# Patient Record
Sex: FEMALE | Race: BLACK OR AFRICAN AMERICAN | NOT HISPANIC OR LATINO
[De-identification: names, ages, dates, MRNs, and addresses within clinical notes are randomized per-mention and may not be internally consistent; named-entity substitution may affect disease eponyms.]

---

## 2017-03-30 ENCOUNTER — APPOINTMENT (OUTPATIENT)
Dept: OPHTHALMOLOGY | Facility: CLINIC | Age: 77
End: 2017-03-30

## 2017-03-30 PROBLEM — Z00.00 ENCOUNTER FOR PREVENTIVE HEALTH EXAMINATION: Status: ACTIVE | Noted: 2017-03-30

## 2017-05-01 ENCOUNTER — APPOINTMENT (OUTPATIENT)
Dept: OPHTHALMOLOGY | Facility: CLINIC | Age: 77
End: 2017-05-01

## 2017-08-07 ENCOUNTER — APPOINTMENT (OUTPATIENT)
Dept: OPHTHALMOLOGY | Facility: CLINIC | Age: 77
End: 2017-08-07
Payer: MEDICARE

## 2017-08-07 PROCEDURE — 92012 INTRM OPH EXAM EST PATIENT: CPT

## 2017-09-09 ENCOUNTER — OUTPATIENT (OUTPATIENT)
Dept: OUTPATIENT SERVICES | Facility: HOSPITAL | Age: 77
LOS: 1 days | End: 2017-09-09

## 2017-09-09 DIAGNOSIS — Z00.8 ENCOUNTER FOR OTHER GENERAL EXAMINATION: ICD-10-CM

## 2017-09-11 ENCOUNTER — APPOINTMENT (OUTPATIENT)
Dept: RADIOLOGY | Facility: IMAGING CENTER | Age: 77
End: 2017-09-11
Payer: MEDICARE

## 2017-09-11 ENCOUNTER — OUTPATIENT (OUTPATIENT)
Dept: OUTPATIENT SERVICES | Facility: HOSPITAL | Age: 77
LOS: 1 days | End: 2017-09-11
Payer: MEDICARE

## 2017-09-11 DIAGNOSIS — Z00.8 ENCOUNTER FOR OTHER GENERAL EXAMINATION: ICD-10-CM

## 2017-09-11 PROCEDURE — 77080 DXA BONE DENSITY AXIAL: CPT | Mod: 26

## 2017-09-11 PROCEDURE — 77080 DXA BONE DENSITY AXIAL: CPT

## 2017-11-14 ENCOUNTER — APPOINTMENT (OUTPATIENT)
Dept: OPHTHALMOLOGY | Facility: CLINIC | Age: 77
End: 2017-11-14
Payer: MEDICARE

## 2017-11-14 PROCEDURE — 92015 DETERMINE REFRACTIVE STATE: CPT

## 2017-11-14 PROCEDURE — 92012 INTRM OPH EXAM EST PATIENT: CPT

## 2017-11-14 PROCEDURE — 92083 EXTENDED VISUAL FIELD XM: CPT

## 2018-02-12 ENCOUNTER — APPOINTMENT (OUTPATIENT)
Dept: OPHTHALMOLOGY | Facility: CLINIC | Age: 78
End: 2018-02-12
Payer: MEDICARE

## 2018-02-12 PROCEDURE — 92133 CPTRZD OPH DX IMG PST SGM ON: CPT

## 2018-02-12 PROCEDURE — 92014 COMPRE OPH EXAM EST PT 1/>: CPT

## 2018-04-02 ENCOUNTER — OUTPATIENT (OUTPATIENT)
Dept: OUTPATIENT SERVICES | Facility: HOSPITAL | Age: 78
LOS: 1 days | End: 2018-04-02
Payer: MEDICARE

## 2018-04-02 ENCOUNTER — APPOINTMENT (OUTPATIENT)
Dept: MAMMOGRAPHY | Facility: IMAGING CENTER | Age: 78
End: 2018-04-02
Payer: MEDICARE

## 2018-04-02 ENCOUNTER — APPOINTMENT (OUTPATIENT)
Dept: ULTRASOUND IMAGING | Facility: IMAGING CENTER | Age: 78
End: 2018-04-02
Payer: MEDICARE

## 2018-04-02 DIAGNOSIS — R92.2 INCONCLUSIVE MAMMOGRAM: ICD-10-CM

## 2018-04-02 DIAGNOSIS — N64.4 MASTODYNIA: ICD-10-CM

## 2018-04-02 PROCEDURE — 77063 BREAST TOMOSYNTHESIS BI: CPT | Mod: 26

## 2018-04-02 PROCEDURE — 77067 SCR MAMMO BI INCL CAD: CPT | Mod: 26

## 2018-04-02 PROCEDURE — 76641 ULTRASOUND BREAST COMPLETE: CPT

## 2018-04-02 PROCEDURE — 76641 ULTRASOUND BREAST COMPLETE: CPT | Mod: 26,50

## 2018-04-02 PROCEDURE — 77067 SCR MAMMO BI INCL CAD: CPT

## 2018-04-02 PROCEDURE — 77063 BREAST TOMOSYNTHESIS BI: CPT

## 2018-04-09 ENCOUNTER — APPOINTMENT (OUTPATIENT)
Dept: ULTRASOUND IMAGING | Facility: IMAGING CENTER | Age: 78
End: 2018-04-09
Payer: MEDICARE

## 2018-04-09 ENCOUNTER — APPOINTMENT (OUTPATIENT)
Dept: MAMMOGRAPHY | Facility: IMAGING CENTER | Age: 78
End: 2018-04-09
Payer: MEDICARE

## 2018-04-09 ENCOUNTER — OUTPATIENT (OUTPATIENT)
Dept: OUTPATIENT SERVICES | Facility: HOSPITAL | Age: 78
LOS: 1 days | End: 2018-04-09
Payer: MEDICARE

## 2018-04-09 DIAGNOSIS — R92.2 INCONCLUSIVE MAMMOGRAM: ICD-10-CM

## 2018-04-09 PROCEDURE — 77065 DX MAMMO INCL CAD UNI: CPT

## 2018-04-09 PROCEDURE — 76642 ULTRASOUND BREAST LIMITED: CPT

## 2018-04-09 PROCEDURE — 77065 DX MAMMO INCL CAD UNI: CPT | Mod: 26,LT

## 2018-04-09 PROCEDURE — G0279: CPT | Mod: 26

## 2018-04-09 PROCEDURE — 76642 ULTRASOUND BREAST LIMITED: CPT | Mod: 26,LT

## 2018-04-09 PROCEDURE — G0279: CPT

## 2018-05-07 ENCOUNTER — APPOINTMENT (OUTPATIENT)
Dept: OPHTHALMOLOGY | Facility: CLINIC | Age: 78
End: 2018-05-07
Payer: MEDICARE

## 2018-05-07 PROCEDURE — 92083 EXTENDED VISUAL FIELD XM: CPT

## 2018-05-07 PROCEDURE — 92012 INTRM OPH EXAM EST PATIENT: CPT

## 2018-05-07 PROCEDURE — ZZZZZ: CPT

## 2018-08-13 ENCOUNTER — APPOINTMENT (OUTPATIENT)
Dept: OPHTHALMOLOGY | Facility: CLINIC | Age: 78
End: 2018-08-13
Payer: MEDICARE

## 2018-08-13 PROCEDURE — 92133 CPTRZD OPH DX IMG PST SGM ON: CPT

## 2018-08-13 PROCEDURE — 92014 COMPRE OPH EXAM EST PT 1/>: CPT

## 2018-11-12 ENCOUNTER — APPOINTMENT (OUTPATIENT)
Dept: OPHTHALMOLOGY | Facility: CLINIC | Age: 78
End: 2018-11-12
Payer: MEDICARE

## 2018-11-12 PROCEDURE — 92012 INTRM OPH EXAM EST PATIENT: CPT

## 2018-11-12 PROCEDURE — 92083 EXTENDED VISUAL FIELD XM: CPT

## 2018-11-12 PROCEDURE — ZZZZZ: CPT

## 2019-03-18 ENCOUNTER — APPOINTMENT (OUTPATIENT)
Dept: OPHTHALMOLOGY | Facility: CLINIC | Age: 79
End: 2019-03-18

## 2019-04-15 ENCOUNTER — APPOINTMENT (OUTPATIENT)
Dept: ULTRASOUND IMAGING | Facility: IMAGING CENTER | Age: 79
End: 2019-04-15
Payer: MEDICARE

## 2019-04-15 ENCOUNTER — APPOINTMENT (OUTPATIENT)
Dept: RADIOLOGY | Facility: IMAGING CENTER | Age: 79
End: 2019-04-15
Payer: MEDICARE

## 2019-04-15 ENCOUNTER — APPOINTMENT (OUTPATIENT)
Dept: MAMMOGRAPHY | Facility: IMAGING CENTER | Age: 79
End: 2019-04-15
Payer: MEDICARE

## 2019-04-15 ENCOUNTER — OUTPATIENT (OUTPATIENT)
Dept: OUTPATIENT SERVICES | Facility: HOSPITAL | Age: 79
LOS: 1 days | End: 2019-04-15
Payer: MEDICARE

## 2019-04-15 DIAGNOSIS — Z13.820 ENCOUNTER FOR SCREENING FOR OSTEOPOROSIS: ICD-10-CM

## 2019-04-15 DIAGNOSIS — Z12.31 ENCOUNTER FOR SCREENING MAMMOGRAM FOR MALIGNANT NEOPLASM OF BREAST: ICD-10-CM

## 2019-04-15 PROCEDURE — 77067 SCR MAMMO BI INCL CAD: CPT

## 2019-04-15 PROCEDURE — 76641 ULTRASOUND BREAST COMPLETE: CPT

## 2019-04-15 PROCEDURE — 77063 BREAST TOMOSYNTHESIS BI: CPT | Mod: 26

## 2019-04-15 PROCEDURE — 76641 ULTRASOUND BREAST COMPLETE: CPT | Mod: 26,50

## 2019-04-15 PROCEDURE — 77067 SCR MAMMO BI INCL CAD: CPT | Mod: 26

## 2019-04-15 PROCEDURE — 77063 BREAST TOMOSYNTHESIS BI: CPT

## 2019-04-29 ENCOUNTER — NON-APPOINTMENT (OUTPATIENT)
Age: 79
End: 2019-04-29

## 2019-04-29 ENCOUNTER — APPOINTMENT (OUTPATIENT)
Dept: OPHTHALMOLOGY | Facility: CLINIC | Age: 79
End: 2019-04-29
Payer: MEDICARE

## 2019-04-29 PROCEDURE — 92250 FUNDUS PHOTOGRAPHY W/I&R: CPT

## 2019-04-29 PROCEDURE — 92014 COMPRE OPH EXAM EST PT 1/>: CPT

## 2019-08-12 ENCOUNTER — APPOINTMENT (OUTPATIENT)
Dept: OPHTHALMOLOGY | Facility: CLINIC | Age: 79
End: 2019-08-12
Payer: MEDICARE

## 2019-08-12 ENCOUNTER — NON-APPOINTMENT (OUTPATIENT)
Age: 79
End: 2019-08-12

## 2019-08-12 PROCEDURE — ZZZZZ: CPT

## 2019-08-12 PROCEDURE — 92083 EXTENDED VISUAL FIELD XM: CPT

## 2019-08-12 PROCEDURE — 92133 CPTRZD OPH DX IMG PST SGM ON: CPT

## 2019-08-12 PROCEDURE — 92012 INTRM OPH EXAM EST PATIENT: CPT

## 2019-11-11 ENCOUNTER — NON-APPOINTMENT (OUTPATIENT)
Age: 79
End: 2019-11-11

## 2019-11-11 ENCOUNTER — APPOINTMENT (OUTPATIENT)
Dept: OPHTHALMOLOGY | Facility: CLINIC | Age: 79
End: 2019-11-11
Payer: MEDICARE

## 2019-11-11 PROCEDURE — 92012 INTRM OPH EXAM EST PATIENT: CPT

## 2019-12-03 ENCOUNTER — NON-APPOINTMENT (OUTPATIENT)
Age: 79
End: 2019-12-03

## 2019-12-03 ENCOUNTER — APPOINTMENT (OUTPATIENT)
Dept: OPHTHALMOLOGY | Facility: CLINIC | Age: 79
End: 2019-12-03
Payer: MEDICARE

## 2019-12-03 PROCEDURE — 92133 CPTRZD OPH DX IMG PST SGM ON: CPT

## 2019-12-03 PROCEDURE — 92014 COMPRE OPH EXAM EST PT 1/>: CPT

## 2019-12-03 PROCEDURE — 92083 EXTENDED VISUAL FIELD XM: CPT

## 2019-12-03 PROCEDURE — 92020 GONIOSCOPY: CPT

## 2019-12-04 ENCOUNTER — APPOINTMENT (OUTPATIENT)
Dept: OPHTHALMOLOGY | Facility: CLINIC | Age: 79
End: 2019-12-04

## 2019-12-11 ENCOUNTER — APPOINTMENT (OUTPATIENT)
Dept: OPHTHALMOLOGY | Facility: CLINIC | Age: 79
End: 2019-12-11
Payer: MEDICARE

## 2019-12-11 ENCOUNTER — NON-APPOINTMENT (OUTPATIENT)
Age: 79
End: 2019-12-11

## 2019-12-11 PROCEDURE — 65855 TRABECULOPLASTY LASER SURG: CPT | Mod: LT

## 2019-12-26 ENCOUNTER — APPOINTMENT (OUTPATIENT)
Dept: OPHTHALMOLOGY | Facility: CLINIC | Age: 79
End: 2019-12-26
Payer: MEDICARE

## 2019-12-26 ENCOUNTER — NON-APPOINTMENT (OUTPATIENT)
Age: 79
End: 2019-12-26

## 2019-12-26 PROCEDURE — 65855 TRABECULOPLASTY LASER SURG: CPT | Mod: RT

## 2020-01-07 ENCOUNTER — APPOINTMENT (OUTPATIENT)
Dept: OPHTHALMOLOGY | Facility: CLINIC | Age: 80
End: 2020-01-07
Payer: MEDICARE

## 2020-01-07 ENCOUNTER — NON-APPOINTMENT (OUTPATIENT)
Age: 80
End: 2020-01-07

## 2020-01-07 PROCEDURE — 92012 INTRM OPH EXAM EST PATIENT: CPT

## 2020-02-10 ENCOUNTER — APPOINTMENT (OUTPATIENT)
Dept: OPHTHALMOLOGY | Facility: CLINIC | Age: 80
End: 2020-02-10
Payer: MEDICARE

## 2020-02-10 ENCOUNTER — NON-APPOINTMENT (OUTPATIENT)
Age: 80
End: 2020-02-10

## 2020-02-10 PROCEDURE — 92012 INTRM OPH EXAM EST PATIENT: CPT

## 2020-02-13 ENCOUNTER — APPOINTMENT (OUTPATIENT)
Dept: OPHTHALMOLOGY | Facility: CLINIC | Age: 80
End: 2020-02-13

## 2020-02-13 ENCOUNTER — APPOINTMENT (OUTPATIENT)
Dept: OPHTHALMOLOGY | Facility: CLINIC | Age: 80
End: 2020-02-13
Payer: MEDICARE

## 2020-02-13 ENCOUNTER — NON-APPOINTMENT (OUTPATIENT)
Age: 80
End: 2020-02-13

## 2020-02-13 PROCEDURE — 92012 INTRM OPH EXAM EST PATIENT: CPT

## 2020-03-26 ENCOUNTER — APPOINTMENT (OUTPATIENT)
Dept: OPHTHALMOLOGY | Facility: CLINIC | Age: 80
End: 2020-03-26

## 2020-06-08 ENCOUNTER — APPOINTMENT (OUTPATIENT)
Dept: OPHTHALMOLOGY | Facility: CLINIC | Age: 80
End: 2020-06-08
Payer: MEDICARE

## 2020-06-08 ENCOUNTER — NON-APPOINTMENT (OUTPATIENT)
Age: 80
End: 2020-06-08

## 2020-06-08 PROCEDURE — 92012 INTRM OPH EXAM EST PATIENT: CPT

## 2020-07-27 ENCOUNTER — OUTPATIENT (OUTPATIENT)
Dept: OUTPATIENT SERVICES | Facility: HOSPITAL | Age: 80
LOS: 1 days | End: 2020-07-27
Payer: MEDICARE

## 2020-07-27 ENCOUNTER — APPOINTMENT (OUTPATIENT)
Dept: MAMMOGRAPHY | Facility: IMAGING CENTER | Age: 80
End: 2020-07-27
Payer: MEDICARE

## 2020-07-27 ENCOUNTER — APPOINTMENT (OUTPATIENT)
Dept: ULTRASOUND IMAGING | Facility: IMAGING CENTER | Age: 80
End: 2020-07-27
Payer: MEDICARE

## 2020-07-27 DIAGNOSIS — Z12.31 ENCOUNTER FOR SCREENING MAMMOGRAM FOR MALIGNANT NEOPLASM OF BREAST: ICD-10-CM

## 2020-07-27 PROCEDURE — 76641 ULTRASOUND BREAST COMPLETE: CPT

## 2020-07-27 PROCEDURE — 76641 ULTRASOUND BREAST COMPLETE: CPT | Mod: 26,50

## 2020-07-27 PROCEDURE — 77067 SCR MAMMO BI INCL CAD: CPT

## 2020-07-27 PROCEDURE — 77067 SCR MAMMO BI INCL CAD: CPT | Mod: 26

## 2020-07-27 PROCEDURE — 77063 BREAST TOMOSYNTHESIS BI: CPT | Mod: 26

## 2020-07-27 PROCEDURE — 77063 BREAST TOMOSYNTHESIS BI: CPT

## 2020-08-24 ENCOUNTER — APPOINTMENT (OUTPATIENT)
Dept: CT IMAGING | Facility: IMAGING CENTER | Age: 80
End: 2020-08-24
Payer: MEDICARE

## 2020-08-24 ENCOUNTER — OUTPATIENT (OUTPATIENT)
Dept: OUTPATIENT SERVICES | Facility: HOSPITAL | Age: 80
LOS: 1 days | End: 2020-08-24
Payer: MEDICARE

## 2020-08-24 DIAGNOSIS — Z00.8 ENCOUNTER FOR OTHER GENERAL EXAMINATION: ICD-10-CM

## 2020-08-24 PROCEDURE — 82565 ASSAY OF CREATININE: CPT

## 2020-08-24 PROCEDURE — 75635 CT ANGIO ABDOMINAL ARTERIES: CPT

## 2020-08-24 PROCEDURE — 75635 CT ANGIO ABDOMINAL ARTERIES: CPT | Mod: 26

## 2020-09-14 ENCOUNTER — NON-APPOINTMENT (OUTPATIENT)
Age: 80
End: 2020-09-14

## 2020-09-14 ENCOUNTER — APPOINTMENT (OUTPATIENT)
Dept: OPHTHALMOLOGY | Facility: CLINIC | Age: 80
End: 2020-09-14
Payer: MEDICARE

## 2020-09-14 PROCEDURE — 92286 ANT SGM IMG I&R SPECLR MIC: CPT

## 2020-09-14 PROCEDURE — 92133 CPTRZD OPH DX IMG PST SGM ON: CPT

## 2020-09-14 PROCEDURE — ZZZZZ: CPT

## 2020-09-14 PROCEDURE — 92083 EXTENDED VISUAL FIELD XM: CPT

## 2020-09-14 PROCEDURE — 92012 INTRM OPH EXAM EST PATIENT: CPT

## 2020-12-07 ENCOUNTER — NON-APPOINTMENT (OUTPATIENT)
Age: 80
End: 2020-12-07

## 2020-12-07 ENCOUNTER — APPOINTMENT (OUTPATIENT)
Dept: OPHTHALMOLOGY | Facility: CLINIC | Age: 80
End: 2020-12-07
Payer: MEDICARE

## 2020-12-07 PROCEDURE — 92012 INTRM OPH EXAM EST PATIENT: CPT

## 2020-12-08 ENCOUNTER — APPOINTMENT (OUTPATIENT)
Dept: OPHTHALMOLOGY | Facility: CLINIC | Age: 80
End: 2020-12-08

## 2020-12-17 ENCOUNTER — APPOINTMENT (OUTPATIENT)
Dept: OPHTHALMOLOGY | Facility: CLINIC | Age: 80
End: 2020-12-17

## 2021-01-08 ENCOUNTER — NON-APPOINTMENT (OUTPATIENT)
Age: 81
End: 2021-01-08

## 2021-01-08 ENCOUNTER — APPOINTMENT (OUTPATIENT)
Dept: OPHTHALMOLOGY | Facility: CLINIC | Age: 81
End: 2021-01-08
Payer: MEDICARE

## 2021-01-08 PROCEDURE — 92012 INTRM OPH EXAM EST PATIENT: CPT

## 2021-01-20 DIAGNOSIS — Z01.818 ENCOUNTER FOR OTHER PREPROCEDURAL EXAMINATION: ICD-10-CM

## 2021-01-22 ENCOUNTER — APPOINTMENT (OUTPATIENT)
Dept: DISASTER EMERGENCY | Facility: CLINIC | Age: 81
End: 2021-01-22

## 2021-01-23 LAB — SARS-COV-2 N GENE NPH QL NAA+PROBE: NOT DETECTED

## 2021-01-27 ENCOUNTER — APPOINTMENT (OUTPATIENT)
Dept: OPHTHALMOLOGY | Facility: AMBULATORY SURGERY CENTER | Age: 81
End: 2021-01-27
Payer: MEDICARE

## 2021-01-27 PROCEDURE — 66180 AQUEOUS SHUNT EYE W/GRAFT: CPT | Mod: LT

## 2021-01-28 ENCOUNTER — NON-APPOINTMENT (OUTPATIENT)
Age: 81
End: 2021-01-28

## 2021-01-28 ENCOUNTER — APPOINTMENT (OUTPATIENT)
Dept: OPHTHALMOLOGY | Facility: CLINIC | Age: 81
End: 2021-01-28
Payer: MEDICARE

## 2021-01-28 PROCEDURE — 99024 POSTOP FOLLOW-UP VISIT: CPT

## 2021-02-04 ENCOUNTER — NON-APPOINTMENT (OUTPATIENT)
Age: 81
End: 2021-02-04

## 2021-02-04 ENCOUNTER — APPOINTMENT (OUTPATIENT)
Dept: OPHTHALMOLOGY | Facility: CLINIC | Age: 81
End: 2021-02-04
Payer: MEDICARE

## 2021-02-04 PROCEDURE — 99024 POSTOP FOLLOW-UP VISIT: CPT

## 2021-02-11 ENCOUNTER — NON-APPOINTMENT (OUTPATIENT)
Age: 81
End: 2021-02-11

## 2021-02-11 ENCOUNTER — APPOINTMENT (OUTPATIENT)
Dept: OPHTHALMOLOGY | Facility: CLINIC | Age: 81
End: 2021-02-11
Payer: MEDICARE

## 2021-02-11 PROCEDURE — 99024 POSTOP FOLLOW-UP VISIT: CPT

## 2021-02-18 ENCOUNTER — NON-APPOINTMENT (OUTPATIENT)
Age: 81
End: 2021-02-18

## 2021-02-18 ENCOUNTER — APPOINTMENT (OUTPATIENT)
Dept: OPHTHALMOLOGY | Facility: CLINIC | Age: 81
End: 2021-02-18
Payer: MEDICARE

## 2021-02-18 PROCEDURE — 99024 POSTOP FOLLOW-UP VISIT: CPT

## 2021-02-25 ENCOUNTER — APPOINTMENT (OUTPATIENT)
Dept: OPHTHALMOLOGY | Facility: CLINIC | Age: 81
End: 2021-02-25
Payer: MEDICARE

## 2021-02-25 ENCOUNTER — NON-APPOINTMENT (OUTPATIENT)
Age: 81
End: 2021-02-25

## 2021-02-25 PROCEDURE — 99024 POSTOP FOLLOW-UP VISIT: CPT

## 2021-03-02 ENCOUNTER — APPOINTMENT (OUTPATIENT)
Dept: OPHTHALMOLOGY | Facility: CLINIC | Age: 81
End: 2021-03-02
Payer: MEDICARE

## 2021-03-02 ENCOUNTER — NON-APPOINTMENT (OUTPATIENT)
Age: 81
End: 2021-03-02

## 2021-03-02 PROCEDURE — 99024 POSTOP FOLLOW-UP VISIT: CPT

## 2021-03-25 ENCOUNTER — APPOINTMENT (OUTPATIENT)
Dept: OPHTHALMOLOGY | Facility: CLINIC | Age: 81
End: 2021-03-25
Payer: MEDICARE

## 2021-03-25 ENCOUNTER — NON-APPOINTMENT (OUTPATIENT)
Age: 81
End: 2021-03-25

## 2021-03-25 PROCEDURE — 99024 POSTOP FOLLOW-UP VISIT: CPT

## 2021-04-29 ENCOUNTER — NON-APPOINTMENT (OUTPATIENT)
Age: 81
End: 2021-04-29

## 2021-04-29 ENCOUNTER — APPOINTMENT (OUTPATIENT)
Dept: OPHTHALMOLOGY | Facility: CLINIC | Age: 81
End: 2021-04-29
Payer: MEDICARE

## 2021-04-29 PROCEDURE — 99072 ADDL SUPL MATRL&STAF TM PHE: CPT

## 2021-04-29 PROCEDURE — 92012 INTRM OPH EXAM EST PATIENT: CPT

## 2021-05-27 ENCOUNTER — APPOINTMENT (OUTPATIENT)
Dept: OPHTHALMOLOGY | Facility: CLINIC | Age: 81
End: 2021-05-27
Payer: MEDICARE

## 2021-05-27 ENCOUNTER — NON-APPOINTMENT (OUTPATIENT)
Age: 81
End: 2021-05-27

## 2021-05-27 PROCEDURE — 92133 CPTRZD OPH DX IMG PST SGM ON: CPT

## 2021-05-27 PROCEDURE — 92012 INTRM OPH EXAM EST PATIENT: CPT

## 2021-06-24 ENCOUNTER — APPOINTMENT (OUTPATIENT)
Dept: OPHTHALMOLOGY | Facility: CLINIC | Age: 81
End: 2021-06-24

## 2021-07-29 ENCOUNTER — OUTPATIENT (OUTPATIENT)
Dept: OUTPATIENT SERVICES | Facility: HOSPITAL | Age: 81
LOS: 1 days | End: 2021-07-29
Payer: MEDICARE

## 2021-07-29 ENCOUNTER — APPOINTMENT (OUTPATIENT)
Dept: MAMMOGRAPHY | Facility: IMAGING CENTER | Age: 81
End: 2021-07-29
Payer: MEDICARE

## 2021-07-29 ENCOUNTER — APPOINTMENT (OUTPATIENT)
Dept: ULTRASOUND IMAGING | Facility: IMAGING CENTER | Age: 81
End: 2021-07-29
Payer: MEDICARE

## 2021-07-29 DIAGNOSIS — Z00.8 ENCOUNTER FOR OTHER GENERAL EXAMINATION: ICD-10-CM

## 2021-07-29 PROCEDURE — 77063 BREAST TOMOSYNTHESIS BI: CPT

## 2021-07-29 PROCEDURE — 77063 BREAST TOMOSYNTHESIS BI: CPT | Mod: 26

## 2021-07-29 PROCEDURE — 77067 SCR MAMMO BI INCL CAD: CPT | Mod: 26

## 2021-07-29 PROCEDURE — 76641 ULTRASOUND BREAST COMPLETE: CPT | Mod: 26,50

## 2021-07-29 PROCEDURE — 77067 SCR MAMMO BI INCL CAD: CPT

## 2021-07-29 PROCEDURE — 76641 ULTRASOUND BREAST COMPLETE: CPT

## 2021-09-23 ENCOUNTER — APPOINTMENT (OUTPATIENT)
Dept: OPHTHALMOLOGY | Facility: CLINIC | Age: 81
End: 2021-09-23
Payer: MEDICARE

## 2021-09-23 ENCOUNTER — NON-APPOINTMENT (OUTPATIENT)
Age: 81
End: 2021-09-23

## 2021-09-23 PROCEDURE — 92083 EXTENDED VISUAL FIELD XM: CPT

## 2021-09-23 PROCEDURE — 92012 INTRM OPH EXAM EST PATIENT: CPT

## 2021-10-16 ENCOUNTER — APPOINTMENT (OUTPATIENT)
Dept: DISASTER EMERGENCY | Facility: CLINIC | Age: 81
End: 2021-10-16

## 2021-10-17 LAB — SARS-COV-2 N GENE NPH QL NAA+PROBE: NOT DETECTED

## 2021-10-21 ENCOUNTER — APPOINTMENT (OUTPATIENT)
Dept: OPHTHALMOLOGY | Facility: AMBULATORY MEDICAL SERVICES | Age: 81
End: 2021-10-21
Payer: MEDICARE

## 2021-10-21 PROCEDURE — 66710 CILIARY TRANSSLERAL THERAPY: CPT | Mod: LT

## 2021-10-26 ENCOUNTER — NON-APPOINTMENT (OUTPATIENT)
Age: 81
End: 2021-10-26

## 2021-10-26 ENCOUNTER — APPOINTMENT (OUTPATIENT)
Dept: OPHTHALMOLOGY | Facility: CLINIC | Age: 81
End: 2021-10-26
Payer: MEDICARE

## 2021-10-26 PROCEDURE — 99024 POSTOP FOLLOW-UP VISIT: CPT

## 2022-01-13 ENCOUNTER — NON-APPOINTMENT (OUTPATIENT)
Age: 82
End: 2022-01-13

## 2022-01-13 ENCOUNTER — APPOINTMENT (OUTPATIENT)
Dept: OPHTHALMOLOGY | Facility: CLINIC | Age: 82
End: 2022-01-13
Payer: MEDICARE

## 2022-01-13 PROCEDURE — 92012 INTRM OPH EXAM EST PATIENT: CPT | Mod: 24

## 2022-04-28 ENCOUNTER — NON-APPOINTMENT (OUTPATIENT)
Age: 82
End: 2022-04-28

## 2022-04-28 ENCOUNTER — APPOINTMENT (OUTPATIENT)
Dept: OPHTHALMOLOGY | Facility: CLINIC | Age: 82
End: 2022-04-28
Payer: MEDICARE

## 2022-04-28 PROCEDURE — 92020 GONIOSCOPY: CPT

## 2022-04-28 PROCEDURE — 92012 INTRM OPH EXAM EST PATIENT: CPT

## 2022-04-28 PROCEDURE — 92083 EXTENDED VISUAL FIELD XM: CPT

## 2022-08-30 ENCOUNTER — APPOINTMENT (OUTPATIENT)
Dept: MAMMOGRAPHY | Facility: IMAGING CENTER | Age: 82
End: 2022-08-30

## 2022-08-30 ENCOUNTER — OUTPATIENT (OUTPATIENT)
Dept: OUTPATIENT SERVICES | Facility: HOSPITAL | Age: 82
LOS: 1 days | End: 2022-08-30
Payer: MEDICARE

## 2022-08-30 ENCOUNTER — APPOINTMENT (OUTPATIENT)
Dept: ULTRASOUND IMAGING | Facility: IMAGING CENTER | Age: 82
End: 2022-08-30

## 2022-08-30 DIAGNOSIS — Z12.31 ENCOUNTER FOR SCREENING MAMMOGRAM FOR MALIGNANT NEOPLASM OF BREAST: ICD-10-CM

## 2022-08-30 PROCEDURE — 76641 ULTRASOUND BREAST COMPLETE: CPT

## 2022-08-30 PROCEDURE — 77063 BREAST TOMOSYNTHESIS BI: CPT

## 2022-08-30 PROCEDURE — 77063 BREAST TOMOSYNTHESIS BI: CPT | Mod: 26

## 2022-08-30 PROCEDURE — 76641 ULTRASOUND BREAST COMPLETE: CPT | Mod: 26,50

## 2022-08-30 PROCEDURE — 77067 SCR MAMMO BI INCL CAD: CPT | Mod: 26

## 2022-08-30 PROCEDURE — 77067 SCR MAMMO BI INCL CAD: CPT

## 2022-09-08 ENCOUNTER — APPOINTMENT (OUTPATIENT)
Dept: OPHTHALMOLOGY | Facility: CLINIC | Age: 82
End: 2022-09-08
Payer: MEDICARE

## 2022-09-08 ENCOUNTER — NON-APPOINTMENT (OUTPATIENT)
Age: 82
End: 2022-09-08

## 2022-09-08 PROCEDURE — 92014 COMPRE OPH EXAM EST PT 1/>: CPT

## 2022-09-08 PROCEDURE — 92250 FUNDUS PHOTOGRAPHY W/I&R: CPT

## 2022-09-24 ENCOUNTER — INPATIENT (INPATIENT)
Facility: HOSPITAL | Age: 82
LOS: 1 days | Discharge: HOME | End: 2022-09-26
Attending: INTERNAL MEDICINE | Admitting: INTERNAL MEDICINE

## 2022-09-24 VITALS
HEART RATE: 62 BPM | TEMPERATURE: 97 F | RESPIRATION RATE: 16 BRPM | OXYGEN SATURATION: 100 % | SYSTOLIC BLOOD PRESSURE: 120 MMHG | DIASTOLIC BLOOD PRESSURE: 62 MMHG

## 2022-09-24 LAB
ALBUMIN SERPL ELPH-MCNC: 4.6 G/DL — SIGNIFICANT CHANGE UP (ref 3.3–5)
ALP SERPL-CCNC: 79 U/L — SIGNIFICANT CHANGE UP (ref 40–120)
ALT FLD-CCNC: 13 U/L — SIGNIFICANT CHANGE UP (ref 4–33)
ANION GAP SERPL CALC-SCNC: 14 MMOL/L — SIGNIFICANT CHANGE UP (ref 7–14)
APTT BLD: 35.8 SEC — SIGNIFICANT CHANGE UP (ref 27–36.3)
AST SERPL-CCNC: 20 U/L — SIGNIFICANT CHANGE UP (ref 4–32)
B PERT DNA SPEC QL NAA+PROBE: SIGNIFICANT CHANGE UP
B PERT+PARAPERT DNA PNL SPEC NAA+PROBE: SIGNIFICANT CHANGE UP
BILIRUB SERPL-MCNC: 0.3 MG/DL — SIGNIFICANT CHANGE UP (ref 0.2–1.2)
BORDETELLA PARAPERTUSSIS (RAPRVP): SIGNIFICANT CHANGE UP
BUN SERPL-MCNC: 33 MG/DL — HIGH (ref 7–23)
C PNEUM DNA SPEC QL NAA+PROBE: SIGNIFICANT CHANGE UP
CALCIUM SERPL-MCNC: 10.1 MG/DL — SIGNIFICANT CHANGE UP (ref 8.4–10.5)
CHLORIDE SERPL-SCNC: 94 MMOL/L — LOW (ref 98–107)
CO2 SERPL-SCNC: 31 MMOL/L — SIGNIFICANT CHANGE UP (ref 22–31)
CREAT SERPL-MCNC: 1.32 MG/DL — HIGH (ref 0.5–1.3)
EGFR: 40 ML/MIN/1.73M2 — LOW
FLUAV SUBTYP SPEC NAA+PROBE: SIGNIFICANT CHANGE UP
FLUBV RNA SPEC QL NAA+PROBE: SIGNIFICANT CHANGE UP
GLUCOSE SERPL-MCNC: 198 MG/DL — HIGH (ref 70–99)
HADV DNA SPEC QL NAA+PROBE: SIGNIFICANT CHANGE UP
HCOV 229E RNA SPEC QL NAA+PROBE: SIGNIFICANT CHANGE UP
HCOV HKU1 RNA SPEC QL NAA+PROBE: SIGNIFICANT CHANGE UP
HCOV NL63 RNA SPEC QL NAA+PROBE: SIGNIFICANT CHANGE UP
HCOV OC43 RNA SPEC QL NAA+PROBE: SIGNIFICANT CHANGE UP
HCT VFR BLD CALC: 38.8 % — SIGNIFICANT CHANGE UP (ref 34.5–45)
HGB BLD-MCNC: 12.6 G/DL — SIGNIFICANT CHANGE UP (ref 11.5–15.5)
HMPV RNA SPEC QL NAA+PROBE: SIGNIFICANT CHANGE UP
HPIV1 RNA SPEC QL NAA+PROBE: SIGNIFICANT CHANGE UP
HPIV2 RNA SPEC QL NAA+PROBE: SIGNIFICANT CHANGE UP
HPIV3 RNA SPEC QL NAA+PROBE: SIGNIFICANT CHANGE UP
HPIV4 RNA SPEC QL NAA+PROBE: SIGNIFICANT CHANGE UP
INR BLD: 1.18 RATIO — HIGH (ref 0.88–1.16)
M PNEUMO DNA SPEC QL NAA+PROBE: SIGNIFICANT CHANGE UP
MAGNESIUM SERPL-MCNC: 2.4 MG/DL — SIGNIFICANT CHANGE UP (ref 1.6–2.6)
MCHC RBC-ENTMCNC: 26.8 PG — LOW (ref 27–34)
MCHC RBC-ENTMCNC: 32.5 GM/DL — SIGNIFICANT CHANGE UP (ref 32–36)
MCV RBC AUTO: 82.4 FL — SIGNIFICANT CHANGE UP (ref 80–100)
NRBC # BLD: 0 /100 WBCS — SIGNIFICANT CHANGE UP (ref 0–0)
NRBC # FLD: 0 K/UL — SIGNIFICANT CHANGE UP (ref 0–0)
PHOSPHATE SERPL-MCNC: 2.8 MG/DL — SIGNIFICANT CHANGE UP (ref 2.5–4.5)
PLATELET # BLD AUTO: 363 K/UL — SIGNIFICANT CHANGE UP (ref 150–400)
POTASSIUM SERPL-MCNC: 3.7 MMOL/L — SIGNIFICANT CHANGE UP (ref 3.5–5.3)
POTASSIUM SERPL-SCNC: 3.7 MMOL/L — SIGNIFICANT CHANGE UP (ref 3.5–5.3)
PROT SERPL-MCNC: 7.9 G/DL — SIGNIFICANT CHANGE UP (ref 6–8.3)
PROTHROM AB SERPL-ACNC: 13.7 SEC — HIGH (ref 10.5–13.4)
RAPID RVP RESULT: SIGNIFICANT CHANGE UP
RBC # BLD: 4.71 M/UL — SIGNIFICANT CHANGE UP (ref 3.8–5.2)
RBC # FLD: 14.9 % — HIGH (ref 10.3–14.5)
RSV RNA SPEC QL NAA+PROBE: SIGNIFICANT CHANGE UP
RV+EV RNA SPEC QL NAA+PROBE: SIGNIFICANT CHANGE UP
SARS-COV-2 RNA SPEC QL NAA+PROBE: SIGNIFICANT CHANGE UP
SODIUM SERPL-SCNC: 139 MMOL/L — SIGNIFICANT CHANGE UP (ref 135–145)
TROPONIN T, HIGH SENSITIVITY RESULT: 10 NG/L — SIGNIFICANT CHANGE UP
WBC # BLD: 9.3 K/UL — SIGNIFICANT CHANGE UP (ref 3.8–10.5)
WBC # FLD AUTO: 9.3 K/UL — SIGNIFICANT CHANGE UP (ref 3.8–10.5)

## 2022-09-24 PROCEDURE — 93010 ELECTROCARDIOGRAM REPORT: CPT

## 2022-09-24 PROCEDURE — 99285 EMERGENCY DEPT VISIT HI MDM: CPT | Mod: 25

## 2022-09-24 PROCEDURE — 71045 X-RAY EXAM CHEST 1 VIEW: CPT | Mod: 26

## 2022-09-24 NOTE — ED ADULT TRIAGE NOTE - CHIEF COMPLAINT QUOTE
From home, c/o syncope episode after eating, down for ~5 minutes, witnessed by family, did not hit head/no thinner use. Per family, pt back to baseline. Hx of HTN, DMT2

## 2022-09-24 NOTE — ED PROVIDER NOTE - ALCOHOL USE HISTORY SINGLE SELECT
"[facility] -  Progress Note     LOS: 7 days   Patient Care Team:  No Known Provider as PCP - General    Chief Complaint: \"don't feel good\"    Reason for consultation: chf    Subjective     Interval History:   Pt reports \"I don't feel good. I wanna stay here another day. I like it here.\" She continues with subjective dyspnea \"all the time.\"         Review of Systems:   Review of Systems   Constitution: Positive for malaise/fatigue. Negative for weight gain.   Cardiovascular: Positive for dyspnea on exertion and leg swelling (improving). Negative for chest pain, claudication, irregular heartbeat, near-syncope, orthopnea, palpitations, paroxysmal nocturnal dyspnea and syncope.   Respiratory: Positive for shortness of breath. Negative for hemoptysis.    Hematologic/Lymphatic: Negative for bleeding problem.   Gastrointestinal: Negative for melena, nausea and vomiting.   Genitourinary: Negative for hematuria.   Neurological: Negative for focal weakness and light-headedness.   All other systems reviewed and are negative.       Objective     Vital Sign Min/Max for last 24 hours  Temp  Min: 97.3 °F (36.3 °C)  Max: 98.7 °F (37.1 °C)   BP  Min: 121/70  Max: 140/67   Pulse  Min: 92  Max: 109   Resp  Min: 18  Max: 22   SpO2  Min: 94 %  Max: 96 %   No Data Recorded   Weight  Min: 71.3 kg (157 lb 3 oz)  Max: 71.3 kg (157 lb 3 oz)     Last Weight    01/31/18  2000   Weight: 71.3 kg (157 lb 3 oz)         Intake/Output Summary (Last 24 hours) at 02/01/18 0947  Last data filed at 02/01/18 0200   Gross per 24 hour   Intake             1080 ml   Output             1600 ml   Net             -520 ml         Physical Exam:  Physical Exam   Constitutional: She is oriented to person, place, and time. She appears well-developed and well-nourished.   HENT:   Head: Normocephalic and atraumatic.   Eyes: Conjunctivae and EOM are normal. Pupils are equal, round, and reactive to light.   Neck: Normal range of motion. Neck " supple. No JVD present.   Cardiovascular: Normal rate, S1 normal, S2 normal, normal heart sounds, intact distal pulses and normal pulses.  An irregular rhythm present.   No murmur heard.  Pulmonary/Chest: Effort normal. No respiratory distress. She has rales (bases).   Abdominal: Soft. Bowel sounds are normal. She exhibits no distension.   Musculoskeletal: She exhibits edema (trace ble). Tenderness: ble    Neurological: She is alert and oriented to person, place, and time.   Skin: Skin is warm and dry.   ble bandages c/d/i   Psychiatric: She has a normal mood and affect. Judgment normal.   Vitals reviewed.       Results Review:   Lab Results (last 72 hours)     Procedure Component Value Units Date/Time    Occult Blood X 1, Stool - Stool, [[id number]]  (Normal) Collected:  01/29/18 1700    Specimen:  Stool Updated:  01/29/18 1730     Fecal Occult Blood Negative    Hemoglobin & Hematocrit, Blood [[id number]]  (Abnormal) Collected:  01/29/18 1733    Specimen:  Blood Updated:  01/29/18 1757     Hemoglobin 8.4 (L) g/dL      Hematocrit 28.0 (L) %     Occult Blood X 1, Stool - Stool, Per Rectum [[id number]]  (Normal) Collected:  01/29/18 2031    Specimen:  Stool from Per Rectum Updated:  01/29/18 2041     Fecal Occult Blood Negative    Basic Metabolic Panel [[id number]]  (Abnormal) Collected:  01/30/18 0437    Specimen:  Blood Updated:  01/30/18 0540     Glucose 94 mg/dL      BUN 25 (H) mg/dL      Creatinine 1.21 mg/dL      Sodium 140 mmol/L      Potassium 3.9 mmol/L      Chloride 101 mmol/L      CO2 31.0 mmol/L      Calcium 8.6 mg/dL      eGFR Non African Amer 43 (L) mL/min/1.73      BUN/Creatinine Ratio 20.7     Anion Gap 8.0 mmol/L     Narrative:       The MDRD GFR formula is only valid for adults with stable renal function between ages 18 and 70.    CBC & Differential [[id number]] Collected:  01/30/18 0437    Specimen:  Blood Updated:  01/30/18 0543    Narrative:       The following orders were created for panel order CBC &  Differential.  Procedure                               Abnormality         Status                     ---------                               -----------         ------                     Scan Slide[[id number]]                                       Final result               CBC Auto Differential[[id number]]        Abnormal            Final result                 Please view results for these tests on the individual orders.    CBC Auto Differential [[id number]]  (Abnormal) Collected:  01/30/18 0437    Specimen:  Blood Updated:  01/30/18 0543     WBC 7.64 10*3/mm3      RBC 3.36 (L) 10*6/mm3      Hemoglobin 7.4 (L) g/dL      Hematocrit 24.4 (L) %      MCV 72.6 (L) fL      MCH 22.0 (L) pg      MCHC 30.3 (L) g/dL      RDW 17.0 (H) %      RDW-SD 42.3 fl      MPV 10.8 fL      Platelets 345 10*3/mm3      Neutrophil % 62.2 %      Lymphocyte % 25.3 %      Monocyte % 10.5 %      Eosinophil % 1.2 %      Basophil % 0.4 %      Immature Grans % 0.4 %      Neutrophils, Absolute 4.76 10*3/mm3      Lymphocytes, Absolute 1.93 10*3/mm3      Monocytes, Absolute 0.80 10*3/mm3      Eosinophils, Absolute 0.09 10*3/mm3      Basophils, Absolute 0.03 10*3/mm3      Immature Grans, Absolute 0.03 10*3/mm3      nRBC 0.0 /100 WBC     Scan Slide [[id number]] Collected:  01/30/18 0437    Specimen:  Blood Updated:  01/30/18 0543     Anisocytosis Slight/1+     Hypochromia Mod/2+     Microcytes Slight/1+     Poikilocytes Slight/1+     WBC Morphology Normal     Platelet Morphology Normal    Basic Metabolic Panel [[id number]]  (Abnormal) Collected:  01/31/18 0352    Specimen:  Blood Updated:  01/31/18 0444     Glucose 95 mg/dL      BUN 24 (H) mg/dL      Creatinine 1.17 mg/dL      Sodium 139 mmol/L      Potassium 4.4 mmol/L      Chloride 101 mmol/L      CO2 33.0 (H) mmol/L      Calcium 8.4 mg/dL      eGFR Non African Amer 45 (L) mL/min/1.73      BUN/Creatinine Ratio 20.5     Anion Gap 5.0 mmol/L     Narrative:       The MDRD GFR formula is only valid for  adults with stable renal function between ages 18 and 70.    CBC & Differential [[id number]] Collected:  01/31/18 0352    Specimen:  Blood Updated:  01/31/18 0530    Narrative:       The following orders were created for panel order CBC & Differential.  Procedure                               Abnormality         Status                     ---------                               -----------         ------                     Scan Slide[[id number]]                                       Final result               CBC Auto Differential[[id number]]        Abnormal            Final result                 Please view results for these tests on the individual orders.    CBC Auto Differential [[id number]]  (Abnormal) Collected:  01/31/18 0352    Specimen:  Blood Updated:  01/31/18 0530     WBC 7.08 10*3/mm3      RBC 3.71 (L) 10*6/mm3      Hemoglobin 8.3 (L) g/dL      Hematocrit 27.1 (L) %      MCV 73.0 (L) fL      MCH 22.4 (L) pg      MCHC 30.6 (L) g/dL      RDW 18.9 (H) %      RDW-SD 44.3 fl      MPV 10.5 fL      Platelets 351 10*3/mm3      Neutrophil % 61.8 %      Lymphocyte % 24.4 %      Monocyte % 11.6 %      Eosinophil % 1.0 %      Basophil % 0.8 %      Immature Grans % 0.4 %      Neutrophils, Absolute 4.37 10*3/mm3      Lymphocytes, Absolute 1.73 10*3/mm3      Monocytes, Absolute 0.82 10*3/mm3      Eosinophils, Absolute 0.07 10*3/mm3      Basophils, Absolute 0.06 10*3/mm3      Immature Grans, Absolute 0.03 10*3/mm3      nRBC 0.0 /100 WBC     Narrative:       Appended report.  These results have been appended to a previously verified report.    Scan Slide [[id number]] Collected:  01/31/18 0352    Specimen:  Blood Updated:  01/31/18 0530     Hypochromia Slight/1+     Poikilocytes Slight/1+     Stomatocytes --      rare        Target Cells --      rare        WBC Morphology Normal     Platelet Morphology Normal    Troponin [[id number]]  (Abnormal) Collected:  01/31/18 1043    Specimen:  Blood Updated:  01/31/18 1202      Troponin I 0.055 (H) ng/mL               Echo EF Estimated  No results found for: ECHOEFEST      Cath Ejection Fraction Quantitative  No results found for: CATHEF        Medication Review: yes  Current Facility-Administered Medications   Medication Dose Route Frequency Provider Last Rate Last Dose   • acetaminophen (TYLENOL) tablet 650 mg  650 mg Oral Q4H PRN Tyshawn Arias MD   650 mg at 02/01/18 0247   • carvedilol (COREG) tablet 3.125 mg  3.125 mg Oral Q12H JAMIR Thomas   3.125 mg at 02/01/18 0843   • furosemide (LASIX) tablet 20 mg  20 mg Oral BID Dony Samuels DO   20 mg at 02/01/18 0844   • gabapentin (NEURONTIN) capsule 100 mg  100 mg Oral Nightly Dony Samuels DO   100 mg at 01/31/18 2004   • hydrALAZINE (APRESOLINE) tablet 50 mg  50 mg Oral Q8H Chaparro Palacios MD   50 mg at 02/01/18 0527   • isosorbide dinitrate (ISORDIL) tablet 20 mg  20 mg Oral Q8H Chaparro Palacios MD   20 mg at 02/01/18 0527   • lidocaine (LIDODERM) 5 % 1 patch  1 patch Transdermal Q24H Dony Samuels DO   1 patch at 01/31/18 1735   • nystatin (MYCOSTATIN) powder   Topical Q8H Tyshawn Arias MD       • ondansetron (ZOFRAN) tablet 4 mg  4 mg Oral Q6H PRN JAMIR Thomas        Or   • ondansetron ODT (ZOFRAN-ODT) disintegrating tablet 4 mg  4 mg Oral Q6H PRN JAMIR Thomas        Or   • ondansetron (ZOFRAN) injection 4 mg  4 mg Intravenous Q6H PRN JAMIR Thomas       • potassium chloride (MICRO-K) CR capsule 40 mEq  40 mEq Oral Daily Liza Camargo PA-C   40 mEq at 02/01/18 0844   • silver sulfadiazine (SILVADENE, SSD) 1 % cream   Topical Q12H Tyshawn Arias MD       • sodium chloride 0.9 % flush 1-10 mL  1-10 mL Intravenous PRN JAMIR Thomas   10 mL at 01/31/18 0149   • sodium chloride 0.9 % flush 10 mL  10 mL Intravenous PRN Jay Mustafa Jr., MD             Assessment/Plan   1. Acute systoliccongestive heart failure (also with RV dysfunction)  2.  Minimal troponin elevation : likely related to #1  3. Sinus tachycardia: intermittent with frequent PACs  4. Iron deficiency anemia: relatively stable  5. Acute vs. Chronic kidney disease     Plan   1. Patient continues with scattered rales at bases; otherwise her physical exam is much improved in regard to her CHF. I will repeat CXR given rales with subjective dyspnea (though I do not appreciate any tachypnea, accessory muscle usage or respiratory distress on exam)  2. Continue oral diuresis otherwise and monitor response. Strict I/Os, daily weights, monitor renal function/lytes  3. Continue monitoring H/H; patient denies obvious blood loss  4. Ischemic workup on outpatient basis  5. Consider lifevest at discharge? Defer to Dr. Palacios  6. Suspect patient could be discharged to SNF later today or tomorrow from our standpoint, pending above    Marjorie Vasquez PA-C  02/01/18  9:47 AM       never

## 2022-09-24 NOTE — ED PROVIDER NOTE - ATTENDING CONTRIBUTION TO CARE
Dr. Rivera, Attending Physician-  I performed a face to face bedside interview with patient regarding history of present illness, review of symptoms and past medical history. I completed an independent physical exam.  I have discussed patient's plan of care with the resident.    82F, HTM, DM, HLD, who presents after a syncopal episode. Earlier this evening, was sitting at the dinner table. Began to not feel well. And then was noted to lose consciousness and was "going in and out" of consciousness. Recently saw his cardiologist early this week. Is scheduled to get an TTE but has yet to get it. Follows with cardiology Jorge Alberto. Notes that this happened once several months ago but did not seek care at that time. No headaches, fevers, chills, cough, sputum, cp, sob, belly pain, nvd. Physical: afebrile, well appearing, rrr, ctable, abdomen soft, no le edema. Plan: syncope evaluation - admit for tele, TTE. cards.

## 2022-09-24 NOTE — ED PROVIDER NOTE - OBJECTIVE STATEMENT
82F PMH HTN, T2DM, HLD, on xarelto for unclear reason presenting for syncope. Pt was eating food, sitting down, when she felt unwell and lost consciousness. Family was at bedside and held her, did not fall down or hit head. No dizziness, chest pain or palpitations prior to syncope. No urinary incontinence, tongue biting or generalized seizure noted during LOC. Had similar event happen in May this year, did not seek medical attention at that time. Takes valsartan and chlorthalidone, jardiance, rosuvastatin, and xarelto.

## 2022-09-24 NOTE — ED PROVIDER NOTE - CLINICAL SUMMARY MEDICAL DECISION MAKING FREE TEXT BOX
82F PMH HTN, T2DM, HLD, on xarelto for unclear reason presenting for syncope. Pending lab workup, admit for syncope workup

## 2022-09-24 NOTE — ED ADULT NURSE NOTE - OBJECTIVE STATEMENT
pt rcd to rm 11 a&ox4 ambulatory c/o witnessed syncopal episode. pt states "was sitting while eating and had syncopal episode witnessed by sister". pt on xarelto. pt denies trauma to head or falls.  pt has echo scheduled in november. pt normal sinus on monitor. pt abdomen soft and nondistended. pt skin in tact. pt arrives with 20g to the LH. 20g to the LAC placed. labs collected and sent/ pt respirations even and unlabored with no accessory muscle use. pt denies urinary incontinence/ tongue biting.  pt denies chest pain, sob, nausea, vomiting, dizziness, headache. pt in NAD and resting in stretcher.

## 2022-09-25 DIAGNOSIS — R79.89 OTHER SPECIFIED ABNORMAL FINDINGS OF BLOOD CHEMISTRY: ICD-10-CM

## 2022-09-25 DIAGNOSIS — R55 SYNCOPE AND COLLAPSE: ICD-10-CM

## 2022-09-25 DIAGNOSIS — I10 ESSENTIAL (PRIMARY) HYPERTENSION: ICD-10-CM

## 2022-09-25 DIAGNOSIS — Z29.9 ENCOUNTER FOR PROPHYLACTIC MEASURES, UNSPECIFIED: ICD-10-CM

## 2022-09-25 DIAGNOSIS — E11.9 TYPE 2 DIABETES MELLITUS WITHOUT COMPLICATIONS: ICD-10-CM

## 2022-09-25 DIAGNOSIS — E78.5 HYPERLIPIDEMIA, UNSPECIFIED: ICD-10-CM

## 2022-09-25 DIAGNOSIS — R94.31 ABNORMAL ELECTROCARDIOGRAM [ECG] [EKG]: ICD-10-CM

## 2022-09-25 LAB
A1C WITH ESTIMATED AVERAGE GLUCOSE RESULT: 7.2 % — HIGH (ref 4–5.6)
ANION GAP SERPL CALC-SCNC: 11 MMOL/L — SIGNIFICANT CHANGE UP (ref 7–14)
ANION GAP SERPL CALC-SCNC: 12 MMOL/L — SIGNIFICANT CHANGE UP (ref 7–14)
BUN SERPL-MCNC: 28 MG/DL — HIGH (ref 7–23)
BUN SERPL-MCNC: 30 MG/DL — HIGH (ref 7–23)
CALCIUM SERPL-MCNC: 9.4 MG/DL — SIGNIFICANT CHANGE UP (ref 8.4–10.5)
CALCIUM SERPL-MCNC: 9.7 MG/DL — SIGNIFICANT CHANGE UP (ref 8.4–10.5)
CHLORIDE SERPL-SCNC: 95 MMOL/L — LOW (ref 98–107)
CHLORIDE SERPL-SCNC: 99 MMOL/L — SIGNIFICANT CHANGE UP (ref 98–107)
CO2 SERPL-SCNC: 28 MMOL/L — SIGNIFICANT CHANGE UP (ref 22–31)
CO2 SERPL-SCNC: 30 MMOL/L — SIGNIFICANT CHANGE UP (ref 22–31)
CREAT ?TM UR-MCNC: 75 MG/DL — SIGNIFICANT CHANGE UP
CREAT SERPL-MCNC: 1.13 MG/DL — SIGNIFICANT CHANGE UP (ref 0.5–1.3)
CREAT SERPL-MCNC: 1.15 MG/DL — SIGNIFICANT CHANGE UP (ref 0.5–1.3)
D DIMER BLD IA.RAPID-MCNC: 204 NG/ML DDU — SIGNIFICANT CHANGE UP
EGFR: 47 ML/MIN/1.73M2 — LOW
EGFR: 48 ML/MIN/1.73M2 — LOW
ESTIMATED AVERAGE GLUCOSE: 160 — SIGNIFICANT CHANGE UP
GLUCOSE BLDC GLUCOMTR-MCNC: 100 MG/DL — HIGH (ref 70–99)
GLUCOSE BLDC GLUCOMTR-MCNC: 107 MG/DL — HIGH (ref 70–99)
GLUCOSE BLDC GLUCOMTR-MCNC: 112 MG/DL — HIGH (ref 70–99)
GLUCOSE BLDC GLUCOMTR-MCNC: 128 MG/DL — HIGH (ref 70–99)
GLUCOSE BLDC GLUCOMTR-MCNC: 165 MG/DL — HIGH (ref 70–99)
GLUCOSE SERPL-MCNC: 122 MG/DL — HIGH (ref 70–99)
GLUCOSE SERPL-MCNC: 130 MG/DL — HIGH (ref 70–99)
HCT VFR BLD CALC: 35.8 % — SIGNIFICANT CHANGE UP (ref 34.5–45)
HGB BLD-MCNC: 11.8 G/DL — SIGNIFICANT CHANGE UP (ref 11.5–15.5)
MAGNESIUM SERPL-MCNC: 2.1 MG/DL — SIGNIFICANT CHANGE UP (ref 1.6–2.6)
MAGNESIUM SERPL-MCNC: 2.2 MG/DL — SIGNIFICANT CHANGE UP (ref 1.6–2.6)
MAGNESIUM SERPL-MCNC: 2.2 MG/DL — SIGNIFICANT CHANGE UP (ref 1.6–2.6)
MCHC RBC-ENTMCNC: 27 PG — SIGNIFICANT CHANGE UP (ref 27–34)
MCHC RBC-ENTMCNC: 33 GM/DL — SIGNIFICANT CHANGE UP (ref 32–36)
MCV RBC AUTO: 81.9 FL — SIGNIFICANT CHANGE UP (ref 80–100)
NRBC # BLD: 0 /100 WBCS — SIGNIFICANT CHANGE UP (ref 0–0)
NRBC # FLD: 0 K/UL — SIGNIFICANT CHANGE UP (ref 0–0)
PHOSPHATE SERPL-MCNC: 3.1 MG/DL — SIGNIFICANT CHANGE UP (ref 2.5–4.5)
PHOSPHATE SERPL-MCNC: 3.2 MG/DL — SIGNIFICANT CHANGE UP (ref 2.5–4.5)
PHOSPHATE SERPL-MCNC: 3.2 MG/DL — SIGNIFICANT CHANGE UP (ref 2.5–4.5)
PLATELET # BLD AUTO: 333 K/UL — SIGNIFICANT CHANGE UP (ref 150–400)
POTASSIUM SERPL-MCNC: 3.3 MMOL/L — LOW (ref 3.5–5.3)
POTASSIUM SERPL-MCNC: 3.6 MMOL/L — SIGNIFICANT CHANGE UP (ref 3.5–5.3)
POTASSIUM SERPL-SCNC: 3.3 MMOL/L — LOW (ref 3.5–5.3)
POTASSIUM SERPL-SCNC: 3.6 MMOL/L — SIGNIFICANT CHANGE UP (ref 3.5–5.3)
RBC # BLD: 4.37 M/UL — SIGNIFICANT CHANGE UP (ref 3.8–5.2)
RBC # FLD: 15.1 % — HIGH (ref 10.3–14.5)
SODIUM SERPL-SCNC: 136 MMOL/L — SIGNIFICANT CHANGE UP (ref 135–145)
SODIUM SERPL-SCNC: 139 MMOL/L — SIGNIFICANT CHANGE UP (ref 135–145)
SODIUM UR-SCNC: 63 MMOL/L — SIGNIFICANT CHANGE UP
TROPONIN T, HIGH SENSITIVITY RESULT: 10 NG/L — SIGNIFICANT CHANGE UP
WBC # BLD: 7.5 K/UL — SIGNIFICANT CHANGE UP (ref 3.8–10.5)
WBC # FLD AUTO: 7.5 K/UL — SIGNIFICANT CHANGE UP (ref 3.8–10.5)

## 2022-09-25 PROCEDURE — 12345: CPT | Mod: NC

## 2022-09-25 PROCEDURE — 99223 1ST HOSP IP/OBS HIGH 75: CPT | Mod: GC

## 2022-09-25 PROCEDURE — 99497 ADVNCD CARE PLAN 30 MIN: CPT | Mod: GC,25

## 2022-09-25 RX ORDER — ATORVASTATIN CALCIUM 80 MG/1
80 TABLET, FILM COATED ORAL AT BEDTIME
Refills: 0 | Status: DISCONTINUED | OUTPATIENT
Start: 2022-09-25 | End: 2022-09-26

## 2022-09-25 RX ORDER — INSULIN LISPRO 100/ML
VIAL (ML) SUBCUTANEOUS
Refills: 0 | Status: DISCONTINUED | OUTPATIENT
Start: 2022-09-25 | End: 2022-09-26

## 2022-09-25 RX ORDER — HEPARIN SODIUM 5000 [USP'U]/ML
5000 INJECTION INTRAVENOUS; SUBCUTANEOUS EVERY 12 HOURS
Refills: 0 | Status: DISCONTINUED | OUTPATIENT
Start: 2022-09-25 | End: 2022-09-26

## 2022-09-25 RX ORDER — ACETAMINOPHEN 500 MG
650 TABLET ORAL EVERY 6 HOURS
Refills: 0 | Status: DISCONTINUED | OUTPATIENT
Start: 2022-09-25 | End: 2022-09-26

## 2022-09-25 RX ORDER — VALSARTAN 80 MG/1
0 TABLET ORAL
Qty: 0 | Refills: 0 | DISCHARGE

## 2022-09-25 RX ORDER — POTASSIUM CHLORIDE 20 MEQ
40 PACKET (EA) ORAL EVERY 4 HOURS
Refills: 0 | Status: DISCONTINUED | OUTPATIENT
Start: 2022-09-25 | End: 2022-09-25

## 2022-09-25 RX ORDER — DEXTROSE 50 % IN WATER 50 %
12.5 SYRINGE (ML) INTRAVENOUS ONCE
Refills: 0 | Status: DISCONTINUED | OUTPATIENT
Start: 2022-09-25 | End: 2022-09-26

## 2022-09-25 RX ORDER — DEXTROSE 50 % IN WATER 50 %
25 SYRINGE (ML) INTRAVENOUS ONCE
Refills: 0 | Status: DISCONTINUED | OUTPATIENT
Start: 2022-09-25 | End: 2022-09-26

## 2022-09-25 RX ORDER — INFLUENZA VIRUS VACCINE 15; 15; 15; 15 UG/.5ML; UG/.5ML; UG/.5ML; UG/.5ML
0.7 SUSPENSION INTRAMUSCULAR ONCE
Refills: 0 | Status: DISCONTINUED | OUTPATIENT
Start: 2022-09-25 | End: 2022-09-26

## 2022-09-25 RX ORDER — ROSUVASTATIN CALCIUM 5 MG/1
0 TABLET ORAL
Qty: 0 | Refills: 0 | DISCHARGE

## 2022-09-25 RX ORDER — POTASSIUM CHLORIDE 20 MEQ
40 PACKET (EA) ORAL ONCE
Refills: 0 | Status: COMPLETED | OUTPATIENT
Start: 2022-09-25 | End: 2022-09-25

## 2022-09-25 RX ORDER — GLUCAGON INJECTION, SOLUTION 0.5 MG/.1ML
1 INJECTION, SOLUTION SUBCUTANEOUS ONCE
Refills: 0 | Status: DISCONTINUED | OUTPATIENT
Start: 2022-09-25 | End: 2022-09-26

## 2022-09-25 RX ORDER — EMPAGLIFLOZIN 10 MG/1
0 TABLET, FILM COATED ORAL
Qty: 0 | Refills: 0 | DISCHARGE

## 2022-09-25 RX ORDER — SODIUM CHLORIDE 9 MG/ML
1000 INJECTION, SOLUTION INTRAVENOUS
Refills: 0 | Status: DISCONTINUED | OUTPATIENT
Start: 2022-09-25 | End: 2022-09-26

## 2022-09-25 RX ORDER — CHLORTHALIDONE 50 MG
0 TABLET ORAL
Qty: 0 | Refills: 0 | DISCHARGE

## 2022-09-25 RX ORDER — DEXTROSE 50 % IN WATER 50 %
15 SYRINGE (ML) INTRAVENOUS ONCE
Refills: 0 | Status: DISCONTINUED | OUTPATIENT
Start: 2022-09-25 | End: 2022-09-26

## 2022-09-25 RX ORDER — LANOLIN ALCOHOL/MO/W.PET/CERES
3 CREAM (GRAM) TOPICAL AT BEDTIME
Refills: 0 | Status: DISCONTINUED | OUTPATIENT
Start: 2022-09-25 | End: 2022-09-26

## 2022-09-25 RX ORDER — INSULIN LISPRO 100/ML
VIAL (ML) SUBCUTANEOUS AT BEDTIME
Refills: 0 | Status: DISCONTINUED | OUTPATIENT
Start: 2022-09-25 | End: 2022-09-26

## 2022-09-25 RX ADMIN — ATORVASTATIN CALCIUM 80 MILLIGRAM(S): 80 TABLET, FILM COATED ORAL at 21:02

## 2022-09-25 RX ADMIN — Medication 40 MILLIEQUIVALENT(S): at 05:15

## 2022-09-25 RX ADMIN — Medication 40 MILLIEQUIVALENT(S): at 14:22

## 2022-09-25 RX ADMIN — HEPARIN SODIUM 5000 UNIT(S): 5000 INJECTION INTRAVENOUS; SUBCUTANEOUS at 18:28

## 2022-09-25 RX ADMIN — HEPARIN SODIUM 5000 UNIT(S): 5000 INJECTION INTRAVENOUS; SUBCUTANEOUS at 05:16

## 2022-09-25 NOTE — PROGRESS NOTE ADULT - ATTENDING COMMENTS
82 year old woman with history of HTN, HLD, type 2 DM (not on insulin), on Xarelto (2.5 mg BID, unclear reason) who is here for witnessed syncope occurring at 5 PM on 9/24/22, unclear etiology. Likely in setting of vasovagal syncope, but can be due to orthostatic hypotension or cardiogenic or neurogenic syncope.       Patient evaluated at bedside, no acute complaints, denies HA, lightheadedness, dizziness, SOB, CP, numbness, tingling, fever. On cardiac monitor.     Orthostatics negative. TTE pending. Will continue monitoring on telemetry. Electrolyte repletion in setting of QTc prolongation goal of serum K >4, Mg >2. Serial EKGs to monitor for normalization of QTc.

## 2022-09-25 NOTE — H&P ADULT - PROBLEM SELECTOR PLAN 2
Unknown history of CKD vs KATHY iso dehydration/prerenal  BUN/Cr - 25  - Cr - 1.32  - NSR, HR: 62, QTc - 538 ms  - Repeat EKG  - K>4, Mg>2  - f/u troponin  - f/u TTE NSR, HR: 62, QTc - 538 ms  - Repeat EKG  - repleting hypokalemia  - K>4, Mg>2  - f/u troponin  - f/u TTE EKG on admission with prolonged QTc of 538 ms, no bundle branch blocks. Suspect in setting of hypokalemia, as serum K 3.7 but mildly hemolyzed.   - Repeat BMP stat overnight with Mg and Phos  - Repeat EKG to monitor QTc  - Monitor electrolytes and keep serum K >4, Mg >2  - Hold any QT prolonging agents  - F/u repeat trop  - F/u TTE

## 2022-09-25 NOTE — H&P ADULT - CONVERSATION DETAILS
Goals of care discussed at length with patient, who is A&Ox3 at this time. This conversation included explanation of current condition, prognosis, options for therapy, and code status. Patient expressed that she would want chest compressions or intubation if her condition deteriorated to the point that she required such measures. Patient lives alone and is independent with ADLs. She states that she is very close to her nephew, who is involved with and knowledgeable about her care.

## 2022-09-25 NOTE — H&P ADULT - PROBLEM SELECTOR PLAN 6
DVT: Xarelto at home  Diet: DASH/TLC  Dispo: - rousuvastatin at home  - Will do therapeutic interchange once dose confirmed Pt on rosuvastatin at home.  - Will do therapeutic interchange with atorvastatin once dose confirmed

## 2022-09-25 NOTE — PROGRESS NOTE ADULT - PROBLEM SELECTOR PLAN 4
- BP WNL  - Holding home valsartan & chlorthalidone  - confirm dose with [facility] in AM History of HTN managed at home on valsartan and chlorthalidone.  - will hold valsartan and chlorthalidone now; BP well controlled at this time.  - monitor BP  - can add on home meds as needed; would like resolution of ?KATHY prior to resuming History of HTN managed at home on valsartan 160 mg and chlorthalidone 25 mg.  - will hold valsartan and chlorthalidone now; BP well controlled at this time.  - monitor BP  - can add on home meds as needed; would like resolution of ?KATHY prior to resuming

## 2022-09-25 NOTE — H&P ADULT - HISTORY OF PRESENT ILLNESS
82 year old woman with  HTN, T2DM, HLD, on Xarelto who is here for witnessed syncope occurring at 5 PM on 9/24/22. She was having dinner with her nephew, Toro, and she was losing consciousness. She was eating, felt uncomfortable like she was about to faint, and her nephew noticed her head was going down. She lost consciousness for about 3-4 minutes. She was given a cold compress and woke up but still was "in and out" but responding to questions. When she was seen by EMS she had difficulty maintaining attention but did not lose consciousness. She was given oxygen and improved. Of note her FSG was 151 by EMS, and SBP was in the 90s. Of note she does eat well but only drinks 2-3 8 oz of water a day. She denies any lightheadedness when she stands up suddenly. No fecal or urinary incontinence. She was not confused after the episode of fainting and responded to questions appropriately per her nephew. An episode like this last occurred in May. Of note it was very hot that day and she fainted. She has not had any episodes prior to that or any since. She denies any history of seizures. She denies CP, dyspnea, palpitations, dizziness, lightheadedness, headache, abdominal pain, nausea, vomiting, diarrhea, dysuria, hemoptysis, hematochezia, recent viral illness, sick contacts or travel history.     Medical History:   HTN  T2DM  HLD    Medications:   Valsartan  Chlorithalidone  Jardiance  Rosuvastatin  Xarelto    Allergies: none  Surgical History: biopsy > 10 years ago of her GYN area  Family History: Noncontributory  Social History: Lives alone, walks without cane or walker, no drinking, no cigarettes, no illicit drug use  PCP: Dr. Azul  Pharmacy: [address]     Collateral obtained from nephew, Toro May    82 year old woman with  HTN, T2DM, HLD, on Xarelto who is here for witnessed syncope occurring at 5 PM on 9/24/22. She was having dinner with her nephew, Toro, and she was losing consciousness. She was eating, felt uncomfortable like she was about to faint, and her nephew noticed her head was going down. She lost consciousness for about 3-4 minutes. She did not hit her head or have any impact because her nephew held her when he noticed the change in consciousness. She was given a cold compress and woke up but still was "in and out" but responding to questions. When she was seen by EMS she had difficulty maintaining attention but did not lose consciousness. She was given oxygen and improved. Of note her FSG was 151 by EMS, and SBP was in the 90s. Of note she does eat well but only drinks 2-3 8 oz of water a day. She denies any lightheadedness when she stands up suddenly. No fecal or urinary incontinence. She was not confused after the syncopal episode and responded to questions appropriately per her nephew. An episode like this last occurred in May. Of note it was very hot that day and she fainted. She has not had any episodes prior to that or any since. She denies any history of seizures. She denies CP, dyspnea, palpitations, dizziness, lightheadedness, headache, abdominal pain, nausea, vomiting, diarrhea, dysuria, hemoptysis, hematochezia, recent viral illness, sick contacts or travel history.     Medical History:   HTN  T2DM  HLD    Medications:   Valsartan  Chlorithalidone  Jardiance  Rosuvastatin  Xarelto    Allergies: none  Surgical History: biopsy > 10 years ago of her GYN area  Family History: Noncontributory  Social History: Lives alone, walks without cane or walker, no drinking, no cigarettes, no illicit drug use  PCP: Dr. Azul  Pharmacy: [address]     Collateral obtained from nephew, Toro May    82 year old woman with  HTN, T2DM, HLD, on Xarelto who is here for witnessed syncope occurring at 5 PM on 9/24/22. She was having dinner with her nephew, Toro, and she was losing consciousness. She was eating, felt uncomfortable like she was about to faint, and her nephew noticed her head was going down. She lost consciousness for about 3-4 minutes. She did not hit her head or have any impact because her nephew held her when he noticed the change in consciousness. She was given a cold compress and woke up but still was "in and out" but responding to questions. When she was seen by EMS she had difficulty maintaining attention but did not lose consciousness. She was given oxygen and improved. Of note her FSG was 151 by EMS, and SBP was in the 90s. Of note she does eat well but only drinks 2-3 8 oz of water a day. She denies any lightheadedness when she stands up suddenly. No fecal or urinary incontinence. She was not confused after the syncopal episode and responded to questions appropriately per her nephew. An episode like this last occurred in May. Of note it was very hot that day and she fainted. She has not had any episodes prior to that or any since. She denies any history of seizures. She denies CP, dyspnea, palpitations, dizziness, lightheadedness, headache, abdominal pain, nausea, vomiting, diarrhea, dysuria, hemoptysis, hematochezia, recent viral illness, sick contacts or travel history.     Medical History:   HTN  T2DM  HLD    Medications:   Valsartan  Chlorithalidone  Jardiance  Rosuvastatin  Xarelto    Allergies: none  Surgical History: biopsy > 10 years ago of her GYN area  Family History: Noncontributory  Social History: Lives alone, walks without cane or walker, no drinking, no cigarettes, no illicit drug use  PCP: Dr. Azul  Pharmacy: [address]    Full Code   Collateral obtained from nephew, Toro May    82 year old woman with  HTN, T2DM, HLD, on Xarelto who is here for witnessed syncope occurring at 5 PM on 9/24/22. She was having dinner with her nephew, Toro, and she was losing consciousness. She was eating, felt uncomfortable like she was about to faint, and her nephew noticed her head was going down. She lost consciousness for about 3-4 minutes. She did not hit her head or have any impact because her nephew held her when he noticed the change in consciousness. She was given a cold compress and woke up but still was "in and out" but responding to questions. When she was seen by EMS she had difficulty maintaining attention but did not lose consciousness. She was given oxygen and improved. Of note her FSG was 151 by EMS, and SBP was in the 90s. Of note she does eat well but only drinks 2-3 8 oz of water a day. She denies any lightheadedness when she stands up suddenly. No fecal or urinary incontinence. She was not confused after the syncopal episode and responded to questions appropriately per her nephew. An episode like this last occurred in May. Of note it was very hot that day and she fainted. She has not had any episodes prior to that or any since. She denies any history of seizures. She denies CP, dyspnea, palpitations, dizziness, lightheadedness, headache, abdominal pain, nausea, vomiting, diarrhea, dysuria, hemoptysis, hematochezia, recent viral illness, sick contacts or travel history.     Medical History:   HTN  T2DM  HLD    Medications:   Valsartan  Chlorithalidone  Jardiance  Rosuvastatin  Xarelto    Allergies: none  Surgical History: biopsy > 10 years ago GYN region  Family History: Noncontributory  Social History: Lives alone, walks without cane or walker, no drinking, no cigarettes, no illicit drug use  PCP: Dr. Azul  Pharmacy: [address]    Full Code   Collateral obtained from nephew, Toro May    82 year old woman with  HTN, T2DM, HLD, on Xarelto who is here for witnessed syncope occurring at 5 PM on 9/24/22. She was having dinner with her nephew, Toro, and she was losing consciousness. She was eating, felt uncomfortable like she was about to faint, and her nephew noticed her head was going down. She lost consciousness for about 3-4 minutes. She did not hit her head or have any impact because her nephew held her when he noticed the change in consciousness. She was given a cold compress and woke up but still was "in and out" but responding to questions. When she was seen by EMS she had difficulty maintaining attention but did not lose consciousness. She was given oxygen and improved. Of note her FSG was 151 by EMS, and SBP was in the 90s. Of note she does eat well but only drinks 2-3 8 oz of water a day. She denies any lightheadedness when she stands up suddenly. No fecal or urinary incontinence. She was not confused after the syncopal episode and responded to questions appropriately per her nephew. An episode like this last occurred in May. Of note it was very hot that day and she fainted. She has not had any episodes prior to that or any since. She denies any history of seizures. She denies CP, dyspnea, palpitations, dizziness, lightheadedness, headache, abdominal pain, nausea, vomiting, diarrhea, dysuria, hemoptysis, hematochezia, recent viral illness, sick contacts or travel history.     Medical History:   HTN  T2DM  HLD    Medications:   Valsartan  Chlorthalidone  Jardiance  Rosuvastatin  Xarelto    Allergies: none  Surgical History: biopsy > 10 years ago GYN region  Family History: Noncontributory  Social History: Lives alone, walks without cane or walker, no drinking, no cigarettes, no illicit drug use  PCP: Dr. Azul  Pharmacy: [address]    Full Code   **** Limited HPI from pt. Collateral obtained from pt's nephew, Toro May. ****    82 year old woman with history of HTN, HLD, type 2 DM (not on insulin), on Xarelto (2.5 mg BID, unclear reason) who is here for witnessed syncope occurring at 5 PM on 9/24/22. She was having dinner with her nephew, Toro, when she lost consciousness. She was eating and started feeling uncomfortable like she was about to faint, and her nephew noticed her head going down. She lost consciousness for about 3-4 minutes. She did not hit her head anywhere because her nephew held her when he noticed pt's change in consciousness. She was given a cold compress and woke up but still was "in and out," though able to respond to questions appropriately. When she was seen by EMS, she had difficulty maintaining attention but did not lose consciousness. She was given oxygen and improved. Her FSG was 151 and SBP was in the 90s when checked by EMS. Pt's nephew mentions she does eat well but only drinks 2-3 8 oz of water a day. She denies any lightheadedness when she stands up suddenly. No fecal or urinary incontinence. She was not confused after the syncopal episode and responded to questions appropriately per her nephew. An episode like this last occurred in May. Of note, it was very hot that day and she fainted. She has not had any episodes prior to that or any since. She denies any history of seizures. She denies CP, dyspnea, palpitations, dizziness, lightheadedness, headaches, abdominal pain, nausea, vomiting, diarrhea, dysuria, hemoptysis, hematochezia, recent viral illness, sick contacts or travel history.     Medical History:   HTN  HLD  T2DM    Medications:   Valsartan  Chlorthalidone  Jardiance  Rosuvastatin  Xarelto    Allergies: none  Surgical History: biopsy > 10 years ago GYN region  Family History: Noncontributory  Social History: Lives alone, walks without cane or walker, no drinking, no cigarettes, no illicit drug use  PCP: Dr. Azul  Pharmacy: [address]    Full Code   **** Limited HPI from pt, though pt A&Ox3, as she only remembers losing consciousness. Collateral obtained from pt's nephew, Toro May. ****    82 year old woman with history of HTN, HLD, type 2 DM (not on insulin), on Xarelto (2.5 mg BID, unclear reason) who is here for witnessed syncope occurring at 5 PM on 9/24/22. She was having dinner with her nephew, Toro, when she lost consciousness. She was eating and started feeling uncomfortable like she was about to faint, and her nephew noticed her head going down. She lost consciousness for about 3-4 minutes. She did not hit her head anywhere because her nephew held her when he noticed pt's change in consciousness. She was given a cold compress and woke up but still was "in and out," though able to respond to questions appropriately. When she was seen by EMS, she had difficulty maintaining attention but did not lose consciousness. She was given oxygen and improved. Her FSG was 151 and SBP was in the 90s when checked by EMS. Pt's nephew mentions she does eat well but only drinks 2-3 8 oz of water a day. She denies any lightheadedness when she stands up suddenly. No fecal or urinary incontinence. She was not confused after the syncopal episode and responded to questions appropriately per her nephew. An episode like this last occurred in May. Of note, it was very hot that day and she fainted. She has not had any episodes prior to that or any since. She denies any history of seizures. She denies CP, dyspnea, palpitations, dizziness, lightheadedness, headaches, abdominal pain, nausea, vomiting, diarrhea, dysuria, hemoptysis, hematochezia, recent viral illness, sick contacts or travel history.     Medical History:   HTN  HLD  T2DM    Medications:   Valsartan  Chlorthalidone  Jardiance  Rosuvastatin  Xarelto    Allergies: none  Surgical History: biopsy > 10 years ago GYN region  Family History: Noncontributory  Social History: Lives alone, walks without cane or walker, no drinking, no cigarettes, no illicit drug use  PCP: Dr. Azul  Pharmacy: [address]    Full Code

## 2022-09-25 NOTE — H&P ADULT - ASSESSMENT
82 year old woman with  HTN, T2DM, HLD, on Xarelto who is here for witnessed syncope occurring at 5 PM on 9/24/22. Syncope likely 2/2 to orthostatic hypotension vs vasovagal vs cardiogenic vs less likely neurogenic.

## 2022-09-25 NOTE — H&P ADULT - NSHPSOCIALHISTORY_GEN_ALL_CORE
Lives alone, no alcohol, illicit drug use or cigarettes Denies any alcohol, tobacco, or illicit drug use.  Lives alone.

## 2022-09-25 NOTE — PROGRESS NOTE ADULT - PROBLEM SELECTOR PLAN 3
Unknown history of CKD vs KATHY iso dehydration/prerenal  BUN/Cr ratio - 25  - Cr - 1.32 -->1.15  - Holding valsartan  - IV hydration pending orthostatic vitals  - f/u urine creatinine, urine sodium Unknown history of CKD vs KATHY iso dehydration/prerenal  BUN/Cr ratio - 25  - Cr - 1.32 -->1.15  - Holding valsartan in setting of ? KATHY  - encourage PO intake  - trend Cr

## 2022-09-25 NOTE — H&P ADULT - NSHPREVIEWOFSYSTEMS_GEN_ALL_CORE
REVIEW OF SYSTEMS:    CONSTITUTIONAL: No weakness, fevers or chills  EYES/ENT: No visual changes;  No vertigo or throat pain   NECK: No pain or stiffness  RESPIRATORY: No cough, wheezing, hemoptysis; No shortness of breath  CARDIOVASCULAR: No chest pain or palpitations  GASTROINTESTINAL: No abdominal or epigastric pain. No nausea, vomiting, or hematemesis; No diarrhea or constipation. No melena or hematochezia.  GENITOURINARY: No dysuria, frequency or hematuria  NEUROLOGICAL: No numbness or weakness  SKIN: No itching, rashes Constitutional: No generalized weakness, fevers, chills, or weight loss  Eyes: No visual changes, double vision, or eye pain  Ears, Nose, Mouth, Throat: No runny nose, sinus pain, ear pain, tinnitus, sore throat, dysphagia, or odynophagia  Cardiovascular: No chest pain, palpitations, or LE edema  Respiratory: No cough, wheezing, hemoptysis, or shortness of breath  Gastrointestinal: No abdominal pain, nausea/vomiting, diarrhea/constipation, hematemesis, melena, or BRBPR  Genitourinary: No dysuria, frequency, urgency, or hematuria  Musculoskeletal: No back pain or joint pain, swelling, or decreased ROM  Skin: No pruritus or rashes  Neurologic: +Syncope. No seizures, headaches, paresthesias, numbness, or limb weakness.  Psychiatric: No depression, anxiety, or agitation  Endocrine: No heat/cold intolerance, mood swings, sweats, polydipsia, or polyuria  Hematologic/lymphatic: No purpura, petechia, or prolonged or excessive bleeding after dental extraction / injury  Allergic/Immunologic: No anaphylaxis or allergic response to materials, foods, animals    Positives and pertinent negatives noted and all other systems negative.

## 2022-09-25 NOTE — H&P ADULT - PROBLEM SELECTOR PLAN 1
Orthostatic hypotension vs vasovagal iso eating vs cardiogenic vs neurogenic  - Orthostatic vitals  - TTE  - Telemetry  - IV hydration  - UA  - BHB? Orthostatic hypotension vs vasovagal iso eating vs cardiogenic vs neurogenic  Syncopal episode occurred while eating, possible increased vagal tone leading to episode. Orthostatic hypotension possible given low hydration with use of jardiance and chlorithalidone. Prolonged QTc concern for cardiogenic orgin. Less likely AS/low cardiac output. Less likely stroke given non-focal, and no  less likely seizure given no history  - Repeat EKG  - Orthostatic vitals  - IV hydration pending orthostatics  - F/u UA, troponin, d-dimer, repeat bmp  - TTE  - Telemetry Orthostatic hypotension vs vasovagal vs cardiogenic vs neurogenic  Syncopal episode occurred while eating, possible increased vagal tone leading to episode.   Orthostatic hypotension possible given dehydration with use of jardiance and chlorithalidone.   Prolonged QTc concern for cardiogenic origin. Less likely AS/low cardiac output.   Less likely stroke given non-focal, a traumatic, and less likely seizure given history, no post-ictal, no fecal/urinary incontinence  PE unlikely given no CP, no SOB, not requiring oxygen, normotensive, NSR: 60s  - Repeat EKG  - Orthostatic vitals  - IV hydration pending orthostatics  - F/u UA, troponin, d-dimer, repeat bmp  - TTE  - Telemetry  - vitals q4 Orthostatic hypotension vs vasovagal vs cardiogenic vs neurogenic  Syncopal episode occurred while eating, possible increased vagal tone leading to episode.   Orthostatic hypotension possible given dehydration with use of jardiance and chlorithalidone.   Prolonged QTc concern for cardiogenic origin. Less likely AS/low cardiac output.   Less likely stroke given non-focal, a traumatic, and less likely seizure given history, no post-ictal, no fecal/urinary incontinence  PE unlikely given no CP, no SOB, not requiring oxygen, normotensive, NSR: 60s  - Repeat EKG  - Orthostatic vitals negative  - dimer wnl, troponin x2 negative  - F/u UA, troponin,   - TTE  - Telemetry  - vitals q4 Orthostatic hypotension vs vasovagal vs cardiogenic vs neurogenic  Syncopal episode occurred while eating, possible increased vagal tone leading to episode.   Orthostatic hypotension possible given dehydration with use of jardiance and chlorithalidone.   Prolonged QTc concern for cardiogenic origin. Less likely AS/low cardiac output.   Less likely stroke given non-focal, a traumatic, and less likely seizure given history, no post-ictal, no fecal/urinary incontinence  PE unlikely given no CP, no SOB, not requiring oxygen, normotensive, NSR: 60s  - Repeat EKG  - Orthostatic vitals negative  - d-dimer wnl, troponin x2 negative  - F/u UA, troponin,   - TTE  - Telemetry  - vitals q4 Possible etiologies include orthostatic hypotension or vasovagal, cardiogenic, or neurogenic etiology.  Syncopal episode occurred while eating, so possibly increased vagal tone leading to episode.   Orthostatic hypotension possible given dehydration with use of chlorthalidone and Jardiance.   Prolonged QTc concern for cardiogenic origin. Less likely AS/low cardiac output.   Less likely stroke given non-focal exam, no head trauma/strike, and less likely seizure given history, no post-ictal state, no fecal/urinary incontinence.  PE unlikely given no CP, no SOB, not requiring oxygen, normotensive.  - Check orthostatic vitals. Update: orthostatic vitals negative.  - D-dimer wnl, trops x2 negative  - Infectious w/u, including CXR and full RVP, negative. F/u UA.  - TTE ordered  - Repeat EKG to monitor QTc  - Monitor on tele for arrhythmias  - Monitor VS q4hrs for now  - PT consult  - Fall risk protocol

## 2022-09-25 NOTE — H&P ADULT - PROBLEM SELECTOR PLAN 3
- BP WNL  - Holding home valsartan  - confirm dose with [facility] in AM Unknown history of CKD vs KATHY iso dehydration/prerenal  BUN/Cr - 25  - Cr - 1.32  - Holding valsartan  - IV hydration pending orthostatic vitals Unknown history of CKD vs KATHY iso dehydration/prerenal  BUN/Cr ratio - 25  - Cr - 1.32  - Holding valsartan  - IV hydration pending orthostatic vitals Unknown history of CKD vs KATHY iso dehydration/prerenal  BUN/Cr ratio - 25  - Cr - 1.32  - Holding valsartan  - IV hydration pending orthostatic vitals  - f/u urine creatinine, urine sodium Unknown history of CKD vs KATHY iso dehydration/prerenal  BUN/Cr ratio - 25  - Cr - 1.32 -->1.15  - Holding valsartan  - IV hydration pending orthostatic vitals  - f/u urine creatinine, urine sodium Serum Cr 1.32 on admission. CKD or KATHY iso dehydration/prerenal azotemia.  BUN/Cr ratio 25.  - Repeat serum Cr 1.15  - Hold pt's home chlorthalidone and valsartan for now  - IVF hydration pending orthostatic vitals  - Check urine lytes  - Monitor serum Cr and urine output  - Avoid NSAIDs and nephrotoxic agents  - Renally dose meds

## 2022-09-25 NOTE — PROGRESS NOTE ADULT - PROBLEM SELECTOR PLAN 5
- ISS  - a1c 7.2 History of diabetes managed at home on Jardiance 10 mg.  - HbA1c 7.2  - SSI  - FSG  - CC diet History of diabetes managed at home on Jardiance 25 mg.  - HbA1c 7.2  - SSI  - FSG  - CC diet

## 2022-09-25 NOTE — PROGRESS NOTE ADULT - PROBLEM SELECTOR PLAN 1
Orthostatic hypotension vs vasovagal vs cardiogenic vs neurogenic  Syncopal episode occurred while eating, possible increased vagal tone leading to episode. Had similar episode in May with reported fainting episode due to heat. Patient endorses low fluid intake.  Orthostatic hypotension possible given dehydration with use of jardiance and chlorithalidone.   Prolonged QTc concern for cardiogenic origin. Less likely AS/low cardiac output.   Less likely stroke given non-focal, a traumatic, and less likely seizure given history, no post-ictal, no fecal/urinary incontinence  PE unlikely given no CP, no SOB, not requiring oxygen, normotensive, NSR: 60s  - Repeat EKG  - Orthostatic vitals negative  - d-dimer wnl, troponin x2 negative  - F/u UA, troponin,   - TTE  - Telemetry  - vitals q4 Syncopal episode occurred while eating, possible increased vagal tone leading to episode. Had similar episode in May with reported fainting episode due to heat. Low suspicion for cardiac origin or seizure given history. Likely 2/2 vasovagal vs orthostatic (possible in the setting of valsartan and chlorthalidone and decrease fluid intake).  - Repeat EKG  - Orthostatic vitals negative  - d-dimer wnl, troponin x2 negative  - F/u UA and urine lytes  - TTE  - Telemetry  - vitals q4

## 2022-09-25 NOTE — H&P ADULT - ATTENDING COMMENTS
82 year old woman with history of HTN, HLD, type 2 DM (not on insulin), on Xarelto (2.5 mg BID, unclear reason) who is here for witnessed syncope occurring at 5 PM on 9/24/22, unclear etiology. Likely in setting of vasovagal syncope, but can be due to orthostatic hypotension or cardiogenic or neurogenic syncope. Orthostatics and TTE pending. Also found to have prolonged QTc likely 2/2 hypokalemia. Monitor electrolytes and replete PRN to keep serum K >4, Mg >2. Monitor QTc with serial EKGs. Monitor on tele.

## 2022-09-25 NOTE — H&P ADULT - NSHPLABSRESULTS_GEN_ALL_CORE
.  LABS:                         12.6   9.30  )-----------( 363      ( 24 Sep 2022 21:15 )             38.8     09-24    139  |  94<L>  |  33<H>  ----------------------------<  198<H>  3.7   |  31  |  1.32<H>    Ca    10.1      24 Sep 2022 21:15  Phos  2.8     09-24  Mg     2.40     09-24    TPro  7.9  /  Alb  4.6  /  TBili  0.3  /  DBili  x   /  AST  20  /  ALT  13  /  AlkPhos  79  09-24    PT/INR - ( 24 Sep 2022 21:15 )   PT: 13.7 sec;   INR: 1.18 ratio         PTT - ( 24 Sep 2022 21:15 )  PTT:35.8 sec    Serum Pro-Brain Natriuretic Peptide: 77 pg/mL (09-24 @ 21:15)        RADIOLOGY, EKG & ADDITIONAL TESTS: Reviewed. EKGs personally reviewed.   Initial EKG:  NSR at 62 bpm, no acute ischemic changes, QTc 538 ms.    Labs personally reviewed.                        12.6   9.30  )-----------( 363      ( 24 Sep 2022 21:15 )             38.8     09-24    139  |  94<L>  |  33<H>  ----------------------------<  198<H>  3.7   |  31  |  1.32<H>    Ca    10.1      24 Sep 2022 21:15  Phos  2.8     09-24  Mg     2.40     09-24    TPro  7.9  /  Alb  4.6  /  TBili  0.3  /  DBili  x   /  AST  20  /  ALT  13  /  AlkPhos  79  09-24    PT/INR - ( 24 Sep 2022 21:15 )   PT: 13.7 sec;   INR: 1.18 ratio      PTT - ( 24 Sep 2022 21:15 )  PTT:35.8 sec    Serum Pro-Brain Natriuretic Peptide: 77 pg/mL (09-24 @ 21:15)    Imaging personally reviewed.   ACC: [id number] EXAM:  XR CHEST PORTABLE URGENT 1V                        PROCEDURE DATE:  09/24/2022    ******PRELIMINARY REPORT******    ******PRELIMINARY REPORT******     FINDINGS:    The heart is normal in size.  The lungs are clear.  There is no pneumothorax or pleural effusion.    IMPRESSION:  Clear lungs.

## 2022-09-25 NOTE — PROGRESS NOTE ADULT - ASSESSMENT
82 year old woman with  HTN, T2DM, HLD, on Xarelto who is here for witnessed syncope occurring at 5 PM on 9/24/22. Syncope likely 2/2 to orthostatic hypotension vs vasovagal vs cardiogenic vs less likely neurogenic. 82 year old woman with  HTN, T2DM, HLD, on Xarelto who is here for witnessed syncope occurring at 5 PM on 9/24/22. Syncope likely 2/2 vasovagal given history.

## 2022-09-25 NOTE — PROGRESS NOTE ADULT - PROBLEM SELECTOR PLAN 6
- rousuvastatin at home  - Will do therapeutic interchange once dose confirmed History of hypercholesterolemia managed at home on rosuvastatin.  - therapeutic interchange with   - DASH History of hypercholesterolemia managed at home on rosuvastatin 20 mg.  - therapeutic interchange with atorvastatin 80 mg  - DASH  - on Xarelto 2.5 BID at home but patient unsure why she was prescribed this medication; will need to get collateral from PCP

## 2022-09-25 NOTE — H&P ADULT - PROBLEM SELECTOR PLAN 5
- rousuvastatin at home - ISS  - monitor FSG - ISS  - a1c 7.2 - BPs in acceptable range  - Hold pt's home chlorthalidone and valsartan for now given concern for orthostatic hypotension and KATHY  - Confirm dose of BP meds with pt's [facility] in AM and resume BP meds as clinically indicated

## 2022-09-25 NOTE — PROGRESS NOTE ADULT - SUBJECTIVE AND OBJECTIVE BOX
Mariana Bermudez  PGY-1 Resident Physician   Pager [phone]/ [phone]    Patient is a 82y old  Female who presents with a chief complaint of Syncope (25 Sep 2022 01:19)      SUBJECTIVE / OVERNIGHT EVENTS:  Patient seen and evaluated at bedside.    Denies any fevers, chills, CP, or SOB.    Vital Signs Last 24 Hrs  T(C): 36.4 (25 Sep 2022 04:27), Max: 36.4 (25 Sep 2022 04:27)  T(F): 97.6 (25 Sep 2022 04:27), Max: 97.6 (25 Sep 2022 04:27)  HR: 60 (25 Sep 2022 01:21) (60 - 64)  BP: 100/53 (25 Sep 2022 01:21) (100/53 - 120/62)  BP(mean): 68 (25 Sep 2022 01:21) (68 - 74)  RR: 17 (25 Sep 2022 04:27) (12 - 17)  SpO2: 100% (25 Sep 2022 04:27) (99% - 100%)    Parameters below as of 25 Sep 2022 04:27  Patient On (Oxygen Delivery Method): room air        PHYSICAL EXAM:  GENERAL: NAD, well-developed  CHEST/LUNG: Clear to auscultation bilaterally; No wheeze  HEART: Regular rate and rhythm; Normal S1 S2, No murmurs, rubs, or gallops  ABDOMEN: Soft, Nontender, Nondistended; Bowel sounds present  EXTREMITIES:  2+ Peripheral Pulses, No clubbing, cyanosis, or edema  PSYCH: AAOx3    LABS:                        12.6   9.30  )-----------( 363      ( 24 Sep 2022 21:15 )             38.8     Hgb Trend: 12.6<--  09-25    136  |  95<L>  |  30<H>  ----------------------------<  122<H>  3.3<L>   |  30  |  1.15    Ca    9.7      25 Sep 2022 03:11  Phos  3.2     09-25  Mg     2.20     09-25    TPro  7.9  /  Alb  4.6  /  TBili  0.3  /  DBili  x   /  AST  20  /  ALT  13  /  AlkPhos  79  09-24    Creatinine Trend: 1.15<--, 1.32<--  LIVER FUNCTIONS - ( 24 Sep 2022 21:15 )  Alb: 4.6 g/dL / Pro: 7.9 g/dL / ALK PHOS: 79 U/L / ALT: 13 U/L / AST: 20 U/L / GGT: x           PT/INR - ( 24 Sep 2022 21:15 )   PT: 13.7 sec;   INR: 1.18 ratio         PTT - ( 24 Sep 2022 21:15 )  PTT:35.8 sec       Mariana Bermudez  PGY-1 Resident Physician   Pager [phone]/ [phone]    Patient is a 82y old  Female who presents with a chief complaint of Syncope (25 Sep 2022 01:19)      SUBJECTIVE / OVERNIGHT EVENTS:  Patient seen and evaluated at bedside.  Patient with no complaints this AM. States that she would like to eat. Not currently feeling unwell or having any symptoms   Denies any fevers, chills, CP, or SOB.    Vital Signs Last 24 Hrs  T(C): 36.4 (25 Sep 2022 04:27), Max: 36.4 (25 Sep 2022 04:27)  T(F): 97.6 (25 Sep 2022 04:27), Max: 97.6 (25 Sep 2022 04:27)  HR: 60 (25 Sep 2022 01:21) (60 - 64)  BP: 100/53 (25 Sep 2022 01:21) (100/53 - 120/62)  BP(mean): 68 (25 Sep 2022 01:21) (68 - 74)  RR: 17 (25 Sep 2022 04:27) (12 - 17)  SpO2: 100% (25 Sep 2022 04:27) (99% - 100%)    Parameters below as of 25 Sep 2022 04:27  Patient On (Oxygen Delivery Method): room air        PHYSICAL EXAM:  GENERAL: NAD, well-developed  CHEST/LUNG: Clear to auscultation bilaterally; No wheeze  HEART: Regular rate and rhythm; Normal S1 S2, No murmurs, rubs, or gallops  ABDOMEN: Soft, Nontender, Nondistended; Bowel sounds present  EXTREMITIES:  2+ Peripheral Pulses, No clubbing, cyanosis, or edema  PSYCH: AAOx3    LABS:                        12.6   9.30  )-----------( 363      ( 24 Sep 2022 21:15 )             38.8     Hgb Trend: 12.6<--  09-25    136  |  95<L>  |  30<H>  ----------------------------<  122<H>  3.3<L>   |  30  |  1.15    Ca    9.7      25 Sep 2022 03:11  Phos  3.2     09-25  Mg     2.20     09-25    TPro  7.9  /  Alb  4.6  /  TBili  0.3  /  DBili  x   /  AST  20  /  ALT  13  /  AlkPhos  79  09-24    Creatinine Trend: 1.15<--, 1.32<--  LIVER FUNCTIONS - ( 24 Sep 2022 21:15 )  Alb: 4.6 g/dL / Pro: 7.9 g/dL / ALK PHOS: 79 U/L / ALT: 13 U/L / AST: 20 U/L / GGT: x           PT/INR - ( 24 Sep 2022 21:15 )   PT: 13.7 sec;   INR: 1.18 ratio         PTT - ( 24 Sep 2022 21:15 )  PTT:35.8 sec

## 2022-09-25 NOTE — PHYSICAL THERAPY INITIAL EVALUATION ADULT - PERTINENT HX OF CURRENT PROBLEM, REHAB EVAL
Patient is 82 year old woman with history of HTN, HLD, type 2 DM (not on insulin), on Xarelto (2.5 mg BID, unclear reason) who is here for witnessed syncope

## 2022-09-25 NOTE — H&P ADULT - PROBLEM SELECTOR PLAN 4
- ISS  - monitor FSG - BP WNL  - Holding home valsartan & chlorithalidone  - confirm dose with [facility] in AM - BP WNL  - Holding home valsartan & chlorthalidone  - confirm dose with [facility] in AM Pt on Jardiance at home.  - Hold Jardiance while inpatient  - Start low-dose ISS and FS checks qAC tid and qHS  - F/u A1c

## 2022-09-25 NOTE — PATIENT PROFILE ADULT - FALL HARM RISK - HARM RISK INTERVENTIONS

## 2022-09-25 NOTE — PROGRESS NOTE ADULT - PROBLEM SELECTOR PLAN 7
DVT: Heparin 5000 U Q12, Xarelto 2.5 mg BID at home  Diet: DASH/TLC, soft, consistent carbs  Dispo: Pending PT eval - Fluids: PO  - Electrolytes: Will replete to maintain K>4, Phos>3, and Mag>2  - Nutrition: DASH/CC  - Activity: PT   - DVT Prophylaxis:  - Stress Ulcer/GI Prophylaxis: NA  - Disposition: admit to medicine - Fluids: PO  - Electrolytes: Will replete to maintain K>4, Phos>3, and Mag>2  - Nutrition: DASH/CC  - Activity: PT   - DVT Prophylaxis: heparin  - Stress Ulcer/GI Prophylaxis: NA  - Disposition: admit to medicine

## 2022-09-25 NOTE — H&P ADULT - PROBLEM SELECTOR PLAN 7
DVT: Heparin 5000 U Q12, Xarelto at home  Diet: DASH/TLC, soft  Dispo: PT eval DVT: Heparin 5000 U Q12, Xarelto at home  Diet: DASH/TLC, soft, consistent carbs  Dispo: PT eval DVT: Heparin 5000 U Q12, Xarelto 2.5 mg BID at home  Diet: DASH/TLC, soft, consistent carbs  Dispo: Pending PT eval DVT ppx: Will start HSQ 5000 U q12hrs for now, pt is Xarelto 2.5 mg BID at home but unclear reason. Will hold Xarelto for now until indication determined.  Diet: DASH/TLC/CC, soft  Dispo: Pending PT eval

## 2022-09-25 NOTE — PROGRESS NOTE ADULT - PROBLEM SELECTOR PLAN 2
NSR, HR: 62, QTc - 538 ms  - Repeat EKG  - repleting hypokalemia  - K>4, Mg>2  - f/u troponin  - f/u TTE

## 2022-09-25 NOTE — H&P ADULT - NSHPPHYSICALEXAM_GEN_ALL_CORE
LOS: 1d    VITALS:   T(C): 36.2 (09-25-22 @ 01:21), Max: 36.3 (09-24-22 @ 18:36)  HR: 60 (09-25-22 @ 01:21) (60 - 64)  BP: 100/53 (09-25-22 @ 01:21) (100/53 - 120/62)  RR: 12 (09-25-22 @ 01:21) (12 - 16)  SpO2: 99% (09-25-22 @ 01:21) (99% - 100%)    GENERAL: NAD  HEAD:  Atraumatic, Normocephalic  EYES: EOMI, PERRLA, conjunctiva and sclera clear  ENT: Moist mucous membranes  NECK: Supple, No elevated JVP.  CHEST/LUNG: Clear to auscultation bilaterally; No rales, rhonchi, or wheezes.   HEART: Regular rate and rhythm; No murmurs, rubs, or gallops  ABDOMEN: Bowel sounds present; Soft, nontender, nondistended  EXTREMITIES:  2+ Peripheral Pulses, brisk capillary refill. No clubbing, cyanosis, or edema  NERVOUS SYSTEM:  A&Ox3, no focal deficits. UE 5/5, LE 5/5, No facial asymmetry.  SKIN: No rashes or lesions LOS: 1d    VITALS:   T(C): 36.2 (09-25-22 @ 01:21), Max: 36.3 (09-24-22 @ 18:36)  HR: 60 (09-25-22 @ 01:21) (60 - 64)  BP: 100/53 (09-25-22 @ 01:21) (100/53 - 120/62)  RR: 12 (09-25-22 @ 01:21) (12 - 16)  SpO2: 99% (09-25-22 @ 01:21) (99% - 100%)    Vital Signs Last 24 Hrs  T(C): 36.4 (25 Sep 2022 04:27), Max: 36.4 (25 Sep 2022 04:27)  T(F): 97.6 (25 Sep 2022 04:27), Max: 97.6 (25 Sep 2022 04:27)  HR: 60 (25 Sep 2022 01:21) (60 - 64)  BP: 100/53 (25 Sep 2022 01:21) (100/53 - 120/62)  BP(mean): 68 (25 Sep 2022 01:21) (68 - 74)  RR: 17 (25 Sep 2022 04:27) (12 - 17)  SpO2: 100% (25 Sep 2022 04:27) (99% - 100%)    PHYSICAL EXAM:  General: Awake and alert.  No acute distress.  Head: Normocephalic, atraumatic.    Eyes: PERRL.  EOMI.  No scleral icterus.  No conjunctival pallor.  Mouth: Moist MM.  No oropharyngeal exudates.    Neck: Supple.  Full range of motion.  No JVD.  No LAD.  No thyromegaly.  Trachea midline.    Heart: RRR.  Normal S1 and S2.  No murmurs, rubs, or gallops.  No LE edema b/l.   Lungs: Nonlabored breathing.  Good inspiratory effort.  CTAB.  No wheezes, crackles, or rhonchi.    Abdomen: BS+, soft, nontender with no rebound or guarding, nondistended.  No hepatomegaly.   Skin: Warm and dry.  No rashes.  Extremities: No cyanosis.  2+ peripheral pulses b/l.  Musculoskeletal: No joint deformities.  No spinal or paraspinal tenderness.  Neuro: A&Ox3.  CN II-XII intact.  5/5 motor strength in UE and LE b/l.  Tactile sensation intact in UE and LE b/l.  No pronator drift b/l.  No focal deficits.

## 2022-09-26 ENCOUNTER — TRANSCRIPTION ENCOUNTER (OUTPATIENT)
Age: 82
End: 2022-09-26

## 2022-09-26 VITALS
DIASTOLIC BLOOD PRESSURE: 60 MMHG | SYSTOLIC BLOOD PRESSURE: 138 MMHG | HEART RATE: 67 BPM | OXYGEN SATURATION: 99 % | TEMPERATURE: 98 F | RESPIRATION RATE: 17 BRPM

## 2022-09-26 LAB
ALBUMIN SERPL ELPH-MCNC: 4.7 G/DL — SIGNIFICANT CHANGE UP (ref 3.3–5)
ALP SERPL-CCNC: 80 U/L — SIGNIFICANT CHANGE UP (ref 40–120)
ALT FLD-CCNC: 10 U/L — SIGNIFICANT CHANGE UP (ref 4–33)
ANION GAP SERPL CALC-SCNC: 12 MMOL/L — SIGNIFICANT CHANGE UP (ref 7–14)
APPEARANCE UR: CLEAR — SIGNIFICANT CHANGE UP
AST SERPL-CCNC: 22 U/L — SIGNIFICANT CHANGE UP (ref 4–32)
BILIRUB SERPL-MCNC: 0.4 MG/DL — SIGNIFICANT CHANGE UP (ref 0.2–1.2)
BILIRUB UR-MCNC: NEGATIVE — SIGNIFICANT CHANGE UP
BUN SERPL-MCNC: 23 MG/DL — SIGNIFICANT CHANGE UP (ref 7–23)
CALCIUM SERPL-MCNC: 10.4 MG/DL — SIGNIFICANT CHANGE UP (ref 8.4–10.5)
CHLORIDE SERPL-SCNC: 95 MMOL/L — LOW (ref 98–107)
CO2 SERPL-SCNC: 32 MMOL/L — HIGH (ref 22–31)
COLOR SPEC: SIGNIFICANT CHANGE UP
CREAT ?TM UR-MCNC: 72 MG/DL — SIGNIFICANT CHANGE UP
CREAT SERPL-MCNC: 1.16 MG/DL — SIGNIFICANT CHANGE UP (ref 0.5–1.3)
DIFF PNL FLD: NEGATIVE — SIGNIFICANT CHANGE UP
EGFR: 47 ML/MIN/1.73M2 — LOW
GLUCOSE BLDC GLUCOMTR-MCNC: 101 MG/DL — HIGH (ref 70–99)
GLUCOSE BLDC GLUCOMTR-MCNC: 115 MG/DL — HIGH (ref 70–99)
GLUCOSE BLDC GLUCOMTR-MCNC: 89 MG/DL — SIGNIFICANT CHANGE UP (ref 70–99)
GLUCOSE SERPL-MCNC: 104 MG/DL — HIGH (ref 70–99)
GLUCOSE UR QL: ABNORMAL
HCT VFR BLD CALC: 40.9 % — SIGNIFICANT CHANGE UP (ref 34.5–45)
HGB BLD-MCNC: 13.2 G/DL — SIGNIFICANT CHANGE UP (ref 11.5–15.5)
KETONES UR-MCNC: NEGATIVE — SIGNIFICANT CHANGE UP
LEUKOCYTE ESTERASE UR-ACNC: NEGATIVE — SIGNIFICANT CHANGE UP
MAGNESIUM SERPL-MCNC: 2.2 MG/DL — SIGNIFICANT CHANGE UP (ref 1.6–2.6)
MCHC RBC-ENTMCNC: 27 PG — SIGNIFICANT CHANGE UP (ref 27–34)
MCHC RBC-ENTMCNC: 32.3 GM/DL — SIGNIFICANT CHANGE UP (ref 32–36)
MCV RBC AUTO: 83.6 FL — SIGNIFICANT CHANGE UP (ref 80–100)
NITRITE UR-MCNC: NEGATIVE — SIGNIFICANT CHANGE UP
NRBC # BLD: 0 /100 WBCS — SIGNIFICANT CHANGE UP (ref 0–0)
NRBC # FLD: 0 K/UL — SIGNIFICANT CHANGE UP (ref 0–0)
OSMOLALITY UR: 632 MOSM/KG — SIGNIFICANT CHANGE UP (ref 50–1200)
PH UR: 7 — SIGNIFICANT CHANGE UP (ref 5–8)
PHOSPHATE SERPL-MCNC: 3.1 MG/DL — SIGNIFICANT CHANGE UP (ref 2.5–4.5)
PLATELET # BLD AUTO: 348 K/UL — SIGNIFICANT CHANGE UP (ref 150–400)
POTASSIUM SERPL-MCNC: 3.9 MMOL/L — SIGNIFICANT CHANGE UP (ref 3.5–5.3)
POTASSIUM SERPL-SCNC: 3.9 MMOL/L — SIGNIFICANT CHANGE UP (ref 3.5–5.3)
POTASSIUM UR-SCNC: 35.5 MMOL/L — SIGNIFICANT CHANGE UP
PROT ?TM UR-MCNC: 12 MG/DL — SIGNIFICANT CHANGE UP
PROT SERPL-MCNC: 8 G/DL — SIGNIFICANT CHANGE UP (ref 6–8.3)
PROT UR-MCNC: ABNORMAL
PROT/CREAT UR-RTO: 0.2 RATIO — SIGNIFICANT CHANGE UP (ref 0–0.2)
RBC # BLD: 4.89 M/UL — SIGNIFICANT CHANGE UP (ref 3.8–5.2)
RBC # FLD: 15.3 % — HIGH (ref 10.3–14.5)
SODIUM SERPL-SCNC: 139 MMOL/L — SIGNIFICANT CHANGE UP (ref 135–145)
SODIUM UR-SCNC: 100 MMOL/L — SIGNIFICANT CHANGE UP
SP GR SPEC: 1.02 — SIGNIFICANT CHANGE UP (ref 1.01–1.05)
UROBILINOGEN FLD QL: SIGNIFICANT CHANGE UP
UUN UR-MCNC: 623.9 MG/DL — SIGNIFICANT CHANGE UP
WBC # BLD: 6.14 K/UL — SIGNIFICANT CHANGE UP (ref 3.8–10.5)
WBC # FLD AUTO: 6.14 K/UL — SIGNIFICANT CHANGE UP (ref 3.8–10.5)

## 2022-09-26 PROCEDURE — 99239 HOSP IP/OBS DSCHRG MGMT >30: CPT

## 2022-09-26 PROCEDURE — 93306 TTE W/DOPPLER COMPLETE: CPT | Mod: 26

## 2022-09-26 RX ORDER — VALSARTAN 80 MG/1
160 TABLET ORAL
Qty: 0 | Refills: 0 | DISCHARGE

## 2022-09-26 RX ORDER — CHLORTHALIDONE 50 MG
25 TABLET ORAL
Qty: 0 | Refills: 0 | DISCHARGE

## 2022-09-26 RX ORDER — SODIUM CHLORIDE 9 MG/ML
500 INJECTION, SOLUTION INTRAVENOUS ONCE
Refills: 0 | Status: COMPLETED | OUTPATIENT
Start: 2022-09-26 | End: 2022-09-26

## 2022-09-26 RX ADMIN — HEPARIN SODIUM 5000 UNIT(S): 5000 INJECTION INTRAVENOUS; SUBCUTANEOUS at 05:01

## 2022-09-26 RX ADMIN — SODIUM CHLORIDE 1000 MILLILITER(S): 9 INJECTION, SOLUTION INTRAVENOUS at 11:34

## 2022-09-26 NOTE — DISCHARGE NOTE PROVIDER - NSDCCPCAREPLAN_GEN_ALL_CORE_FT
PRINCIPAL DISCHARGE DIAGNOSIS  Diagnosis: Syncope  Assessment and Plan of Treatment: You presented to the hospital after one episode of syncope that was witnessed by your nephew and you did not have any trauma.      SECONDARY DISCHARGE DIAGNOSES  Diagnosis: Hypertension  Assessment and Plan of Treatment: hold valsartan and chlorthalidone     PRINCIPAL DISCHARGE DIAGNOSIS  Diagnosis: Syncope  Assessment and Plan of Treatment: You presented to the hospital after one episode of syncope that was witnessed by your nephew and you did not have any trauma. Your lab work did not indicate any significant abnormalities besides a low potassium on admission which you received repletion for. Otherwise, you initial EKG showed a prolonged segment which normalized. Your echocardiogram was negative and did not show any evidence of heart etiology. Additionally, you were on the cardiac monitor for monitoring for arrythmia and there was nothing. Your syncopal episode was likely a vasovagal episode which typically does not require any treatment. Please return to the hospital if you have a similar repeat episode.      SECONDARY DISCHARGE DIAGNOSES  Diagnosis: Hypertension  Assessment and Plan of Treatment: On presentation, you came in with a diagnosis of hypertension on several home medications. While in the hospital, your BP was well controlled and your medications were held. On discharge, you should stop taking valsartan and chlorthalidone until you follow up with your PCP. Your PCP office was called and they recommend you calling the office tomorrow to schedule an appointment to be seen within this week.

## 2022-09-26 NOTE — PROGRESS NOTE ADULT - PROBLEM SELECTOR PLAN 6
History of hypercholesterolemia managed at home on rosuvastatin 20 mg.  - therapeutic interchange with atorvastatin 80 mg  - DASH  - on Xarelto 2.5 BID at home but patient unsure why she was prescribed this medication; will need to get collateral from PCP History of hypercholesterolemia managed at home on rosuvastatin 20 mg.  - therapeutic interchange with atorvastatin 80 mg  - DASH  - on Xarelto 2.5 BID at home but patient unsure why she was prescribed this medication; will need to get collateral from PCP. Office closed again today

## 2022-09-26 NOTE — DISCHARGE NOTE PROVIDER - HOSPITAL COURSE
HPI:  **** Limited HPI from pt, though pt A&Ox3, as she only remembers losing consciousness. Collateral obtained from pt's nephew, Toro May. ****    82 year old woman with history of HTN, HLD, type 2 DM (not on insulin), on Xarelto (2.5 mg BID, unclear reason) who is here for witnessed syncope occurring at 5 PM on 9/24/22. She was having dinner with her nephew, Toro, when she lost consciousness. She was eating and started feeling uncomfortable like she was about to faint, and her nephew noticed her head going down. She lost consciousness for about 3-4 minutes. She did not hit her head anywhere because her nephew held her when he noticed pt's change in consciousness. She was given a cold compress and woke up but still was "in and out," though able to respond to questions appropriately. When she was seen by EMS, she had difficulty maintaining attention but did not lose consciousness. She was given oxygen and improved. Her FSG was 151 and SBP was in the 90s when checked by EMS. Pt's nephew mentions she does eat well but only drinks 2-3 8 oz of water a day. She denies any lightheadedness when she stands up suddenly. No fecal or urinary incontinence. She was not confused after the syncopal episode and responded to questions appropriately per her nephew. An episode like this last occurred in May. Of note, it was very hot that day and she fainted. She has not had any episodes prior to that or any since. She denies any history of seizures. She denies CP, dyspnea, palpitations, dizziness, lightheadedness, headaches, abdominal pain, nausea, vomiting, diarrhea, dysuria, hemoptysis, hematochezia, recent viral illness, sick contacts or travel history.      HPI:  82 year old woman with history of HTN, HLD, type 2 DM (not on insulin), on Xarelto (2.5 mg BID, unclear reason) who is here for witnessed syncope occurring at 5 PM on 9/24/22. She was having dinner with her nephew, Toro, when she lost consciousness. She was eating and started feeling uncomfortable like she was about to faint, and her nephew noticed her head going down. She lost consciousness for about 3-4 minutes. She did not hit her head anywhere because her nephew held her when he noticed pt's change in consciousness. She was given a cold compress and woke up but still was "in and out," though able to respond to questions appropriately. When she was seen by EMS, she had difficulty maintaining attention but did not lose consciousness. She was given oxygen and improved. Her FSG was 151 and SBP was in the 90s when checked by EMS. Pt's nephew mentions she does eat well but only drinks 2-3 8 oz of water a day. She denies any lightheadedness when she stands up suddenly. No fecal or urinary incontinence. She was not confused after the syncopal episode and responded to questions appropriately per her nephew. An episode like this last occurred in May. Of note, it was very hot that day and she fainted. She has not had any episodes prior to that or any since. She denies any history of seizures. She denies CP, dyspnea, palpitations, dizziness, lightheadedness, headaches, abdominal pain, nausea, vomiting, diarrhea, dysuria, hemoptysis, hematochezia, recent viral illness, sick contacts or travel history.     Hospital Course:   On admission, patient with negative orthostatics. EKG significant for prolonged QTc likely in setting of hypokalemia 3.3 which was repleted. Repeat EKG with QTc --. History most consistent vasovagal episode with similar episode in May due to heat. Telemetry monitoring not significant for arrhythmia. TTE performed with EF 65% with no LV systolic dysfunction and no symptoms of CP, dyspnea or orthopnea.   BP stable while patient in the hospital without extensive HTN regimen with valsartan 160 and chlorthalidone. Spoke with PCP office to pass on message to PCP that BP medications held on discharge and to follow up with PCP within a week. Clarified that patients Xarelto 2.5 BID was prescribed due to moderate PAD. Otherwise patient is medically optimized for discharge home. HPI:  82 year old woman with history of HTN, HLD, type 2 DM (not on insulin), on Xarelto (2.5 mg BID, unclear reason) who is here for witnessed syncope occurring at 5 PM on 9/24/22. She was having dinner with her nephew, Toro, when she lost consciousness. She was eating and started feeling uncomfortable like she was about to faint, and her nephew noticed her head going down. She lost consciousness for about 3-4 minutes. She did not hit her head anywhere because her nephew held her when he noticed pt's change in consciousness. She was given a cold compress and woke up but still was "in and out," though able to respond to questions appropriately. When she was seen by EMS, she had difficulty maintaining attention but did not lose consciousness. She was given oxygen and improved. Her FSG was 151 and SBP was in the 90s when checked by EMS. Pt's nephew mentions she does eat well but only drinks 2-3 8 oz of water a day. She denies any lightheadedness when she stands up suddenly. No fecal or urinary incontinence. She was not confused after the syncopal episode and responded to questions appropriately per her nephew. An episode like this last occurred in May. Of note, it was very hot that day and she fainted. She has not had any episodes prior to that or any since. She denies any history of seizures. She denies CP, dyspnea, palpitations, dizziness, lightheadedness, headaches, abdominal pain, nausea, vomiting, diarrhea, dysuria, hemoptysis, hematochezia, recent viral illness, sick contacts or travel history.     Hospital Course:   On admission, patient with negative orthostatics. EKG significant for prolonged QTc 528 likely in setting of hypokalemia 3.3 which was repleted. Repeat EKG with QTc 449. History most consistent vasovagal episode with similar episode in May due to heat. Telemetry monitoring not significant for arrhythmia. TTE performed with EF 65% with no LV systolic dysfunction and no symptoms of CP, dyspnea or orthopnea.   BP stable while patient in the hospital without extensive HTN regimen with valsartan 160 and chlorthalidone. Spoke with PCP office to pass on message to PCP that BP medications held on discharge and to follow up with PCP within a week. Clarified that patients Xarelto 2.5 BID was prescribed due to moderate PAD. Otherwise patient is medically optimized for discharge home.

## 2022-09-26 NOTE — PROGRESS NOTE ADULT - PROBLEM SELECTOR PLAN 4
History of HTN managed at home on valsartan 160 mg and chlorthalidone 25 mg.  - will hold valsartan and chlorthalidone now; BP well controlled at this time.  - monitor BP  - can add on home meds as needed; would like resolution of ?KATHY prior to resuming History of HTN managed at home on valsartan 160 mg and chlorthalidone 25 mg.  - will hold valsartan and chlorthalidone now; BP well controlled at this time.  - monitor BP  - can add on home meds as needed; would like resolution of ?KATHY prior to resuming  - given well controlled BP inpatient and in setting of syncope, would discharge without BP medications with close follow up with cardiologist/PCP

## 2022-09-26 NOTE — DISCHARGE NOTE PROVIDER - CARE PROVIDER_API CALL
Charlie Rodriguez  CARDIOLOGY  [address] [place] [address], [address]  [place] [address]  Phone: [phone]  Fax: [phone]  Established Patient  Follow Up Time:    Charlie Rodriguez  CARDIOLOGY  [address] [place] [address], [address]  [place] [address]  Phone: [phone]  Fax: [phone]  Established Patient  Follow Up Time: 1 week

## 2022-09-26 NOTE — PROGRESS NOTE ADULT - PROBLEM SELECTOR PLAN 2
NSR, HR: 62, QTc - 538 ms  - Repeat EKG  - repleting hypokalemia  - K>4, Mg>2  - f/u troponin  - f/u TTE NSR, HR: 62, QTc - 538 ms likely in the setting of hypokalemia on presentation.   - Repeat EKG to assess QTc  - repleting hypokalemia  - K>4, Mg>2  - f/u TTE

## 2022-09-26 NOTE — PROGRESS NOTE ADULT - SUBJECTIVE AND OBJECTIVE BOX
Mariana Bermudez  PGY-1 Resident Physician   Pager [phone]/ [phone]    Patient is a 82y old  Female who presents with a chief complaint of Syncope (25 Sep 2022 08:00)      SUBJECTIVE / OVERNIGHT EVENTS:  Patient seen and evaluated at bedside.    Denies any fevers, chills, CP, or SOB.    Vital Signs Last 24 Hrs  T(C): 36.5 (26 Sep 2022 05:00), Max: 37.1 (25 Sep 2022 11:30)  T(F): 97.7 (26 Sep 2022 05:00), Max: 98.7 (25 Sep 2022 11:30)  HR: 62 (26 Sep 2022 05:00) (58 - 82)  BP: 129/71 (26 Sep 2022 05:00) (103/60 - 137/71)  BP(mean): --  RR: 18 (26 Sep 2022 05:00) (17 - 18)  SpO2: 100% (26 Sep 2022 05:00) (97% - 100%)    Parameters below as of 26 Sep 2022 05:00  Patient On (Oxygen Delivery Method): room air        PHYSICAL EXAM:  GENERAL: NAD, well-developed  CHEST/LUNG: Clear to auscultation bilaterally; No wheeze  HEART: Regular rate and rhythm; Normal S1 S2, No murmurs, rubs, or gallops  ABDOMEN: Soft, Nontender, Nondistended; Bowel sounds present  EXTREMITIES:  2+ Peripheral Pulses, No clubbing, cyanosis, or edema  PSYCH: AAOx3    LABS:                        11.8   7.50  )-----------( 333      ( 25 Sep 2022 09:46 )             35.8     Hgb Trend: 11.8<--, 12.6<--  09-25    139  |  99  |  28<H>  ----------------------------<  130<H>  3.6   |  28  |  1.13    Ca    9.4      25 Sep 2022 09:46  Phos  3.1     09-25  Mg     2.10     09-25    TPro  7.9  /  Alb  4.6  /  TBili  0.3  /  DBili  x   /  AST  20  /  ALT  13  /  AlkPhos  79  09-24    Creatinine Trend: 1.13<--, 1.15<--, 1.32<--  LIVER FUNCTIONS - ( 24 Sep 2022 21:15 )  Alb: 4.6 g/dL / Pro: 7.9 g/dL / ALK PHOS: 79 U/L / ALT: 13 U/L / AST: 20 U/L / GGT: x           PT/INR - ( 24 Sep 2022 21:15 )   PT: 13.7 sec;   INR: 1.18 ratio         PTT - ( 24 Sep 2022 21:15 )  PTT:35.8 sec       Mariana Bermudez  PGY-1 Resident Physician   Pager [phone]/ [phone]    Patient is a 82y old  Female who presents with a chief complaint of Syncope (25 Sep 2022 08:00)      SUBJECTIVE / OVERNIGHT EVENTS:  Patient seen and evaluated at bedside.  patient with no complaints this AM. States she feels well, no nausea/ vomiting, dizziness or repeat episodes of syncope.   Denies any fevers, chills, CP, or SOB.    Vital Signs Last 24 Hrs  T(C): 36.5 (26 Sep 2022 05:00), Max: 37.1 (25 Sep 2022 11:30)  T(F): 97.7 (26 Sep 2022 05:00), Max: 98.7 (25 Sep 2022 11:30)  HR: 62 (26 Sep 2022 05:00) (58 - 82)  BP: 129/71 (26 Sep 2022 05:00) (103/60 - 137/71)  BP(mean): --  RR: 18 (26 Sep 2022 05:00) (17 - 18)  SpO2: 100% (26 Sep 2022 05:00) (97% - 100%)    Parameters below as of 26 Sep 2022 05:00  Patient On (Oxygen Delivery Method): room air        PHYSICAL EXAM:  GENERAL: NAD, well-developed  CHEST/LUNG: Clear to auscultation bilaterally; No wheeze  HEART: Regular rate and rhythm; Normal S1 S2, No murmurs, rubs, or gallops  ABDOMEN: Soft, Nontender, Nondistended; Bowel sounds present  EXTREMITIES:  2+ Peripheral Pulses, No clubbing, cyanosis, or edema  PSYCH: AAOx3    LABS:                        11.8   7.50  )-----------( 333      ( 25 Sep 2022 09:46 )             35.8     Hgb Trend: 11.8<--, 12.6<--  09-25    139  |  99  |  28<H>  ----------------------------<  130<H>  3.6   |  28  |  1.13    Ca    9.4      25 Sep 2022 09:46  Phos  3.1     09-25  Mg     2.10     09-25    TPro  7.9  /  Alb  4.6  /  TBili  0.3  /  DBili  x   /  AST  20  /  ALT  13  /  AlkPhos  79  09-24    Creatinine Trend: 1.13<--, 1.15<--, 1.32<--  LIVER FUNCTIONS - ( 24 Sep 2022 21:15 )  Alb: 4.6 g/dL / Pro: 7.9 g/dL / ALK PHOS: 79 U/L / ALT: 13 U/L / AST: 20 U/L / GGT: x           PT/INR - ( 24 Sep 2022 21:15 )   PT: 13.7 sec;   INR: 1.18 ratio         PTT - ( 24 Sep 2022 21:15 )  PTT:35.8 sec

## 2022-09-26 NOTE — PROGRESS NOTE ADULT - ATTENDING COMMENTS
82 year old woman with history of HTN, HLD, type 2 DM (not on insulin), on Xarelto (2.5 mg BID, unclear reason) who is here for witnessed syncope occurring at 5 PM on 9/24/22. Suspect vasovagal event. Trops negative. EKG unremarkable. No sig events on tele. Doubt cardiogenic event. ECHO is pending. No stroke sx or other neuro deficits. Initial labs are notable for KATHY, which is likely 2' prerenal azotemia. Cr has since improved.     Patient evaluated at bedside, son present. Pt has no complaints. She feels well and back to her baseline. Labs showed improve Cr. No other sig labs finding. On exam, chest clear, cardiac RRR, S1S2, no g/r/m. Abd soft, NT. No significant tele events. Pt remains in SR.     -ECHO is pending - if no significant acute findings - ok to DC from med standpoint to home w/ close OP follow up.   -Team unable to obtain collateral from PCP re: AC indication. Per pt, she was started on it to prevent clots. She denied VTEs in the past. She denied having any other cardiac/neuro condition that would require it. As pt is tolerating it and has been on it long term, would not DC until there is some clarification from PCP.   -Would keep Chlorthalidone on hold. Ok to resume ARB on DC.  -EKG personally reviewed interpreted. QTc 460 on repeat. NSR. No acute ST/T changes.     Spoke w/ son at bedside. All Q's and concerns reviewed. Pending ECHO. IF no acute findings, DC home w/ fam - 35 min spent preparing DC, counseling pt, and coordination of care. 82 year old woman with history of HTN, HLD, type 2 DM (not on insulin), on Xarelto (2.5 mg BID, unclear reason) who is here for witnessed syncope occurring at 5 PM on 9/24/22. Suspect vasovagal event. Trops negative. EKG unremarkable. No sig events on tele. Doubt cardiogenic event. ECHO is pending. No stroke sx or other neuro deficits. Initial labs are notable for KATHY, which is likely 2' prerenal azotemia. Cr has since improved.     Patient evaluated at bedside, son present. Pt has no complaints. She feels well and back to her baseline. Labs showed improve Cr. No other sig labs finding. On exam, chest clear, cardiac RRR, S1S2, no g/r/m. Abd soft, NT. No significant tele events. Pt remains in SR.     -ECHO is pending - if no significant acute findings - ok to DC from med standpoint to home w/ close OP follow up.   -Holding home BP meds for now. To be re-evaluated as OP.  -Team spoke w/ PMD. Reviewed dispo meds. Low dose AC Rx-ed for PAD.  -EKG personally reviewed interpreted. QTc 460 on repeat. NSR. No acute ST/T changes.     Spoke w/ son at bedside. All Q's and concerns reviewed. Pending ECHO. IF no acute findings, DC home w/ fam - 35 min spent preparing DC, counseling pt, and coordination of care.

## 2022-09-26 NOTE — DISCHARGE NOTE NURSING/CASE MANAGEMENT/SOCIAL WORK - NSDCPEFALRISK_GEN_ALL_CORE
For information on Fall & Injury Prevention, visit: [email or web address] OR  [email or web address] OR  https://www.cdc.gov/steadi/patient.html

## 2022-09-26 NOTE — PROGRESS NOTE ADULT - PROBLEM SELECTOR PLAN 1
Syncopal episode occurred while eating, possible increased vagal tone leading to episode. Had similar episode in May with reported fainting episode due to heat. Low suspicion for cardiac origin or seizure given history. Likely 2/2 vasovagal vs orthostatic (possible in the setting of valsartan and chlorthalidone and decrease fluid intake).  - Repeat EKG  - Orthostatic vitals negative  - d-dimer wnl, troponin x2 negative  - F/u UA and urine lytes  - TTE  - Telemetry  - vitals q4 Syncopal episode occurred while eating, possible increased vagal tone leading to episode. Had similar episode in May with reported fainting episode due to heat. Low suspicion for cardiac origin or seizure given history. Likely 2/2 vasovagal vs orthostatic (possible in the setting of valsartan and chlorthalidone and decrease fluid intake).  - Repeat EKG to assess QTc  - Orthostatic vitals negative  - d-dimer wnl, troponin x2 negative  - UA w no evidence of infection  - pending TTE  - Telemetry with no episodes overnight  - vitals q4

## 2022-09-26 NOTE — DISCHARGE NOTE PROVIDER - NSDCDCMDCOMP_GEN_ALL_CORE
Include Z78.9 (Other Specified Conditions Influencing Health Status) As An Associated Diagnosis?: No Number Of Freeze-Thaw Cycles: 3 freeze-thaw cycles Post-Care Instructions: I reviewed with the patient in detail post-care instructions. Patient is to wear sunprotection, and avoid picking at any of the treated lesions. Pt may apply Vaseline to crusted or scabbing areas. Medical Necessity Information: It is in your best interest to select a reason for this procedure from the list below. All of these items fulfill various CMS LCD requirements except the new and changing color options. Detail Level: Detailed Consent: The patient's consent was obtained including but not limited to risks of crusting, scabbing, blistering, scarring, darker or lighter pigmentary change, recurrence, incomplete removal and infection. Medical Necessity Clause: This procedure was medically necessary because the lesions that were treated were: This document is complete and the patient is ready for discharge.

## 2022-09-26 NOTE — DISCHARGE NOTE PROVIDER - PROVIDER TOKENS
PROVIDER:[TOKEN:[[id number]],ESTABLISHEDPATIENT:[T]] PROVIDER:[TOKEN:[[id number]],FOLLOWUP:[1 week],ESTABLISHEDPATIENT:[T]]

## 2022-09-26 NOTE — DISCHARGE NOTE NURSING/CASE MANAGEMENT/SOCIAL WORK - PATIENT PORTAL LINK FT
You can access the FollowMyHealth Patient Portal offered by [facility] by registering at the following website: [email or web address]. By joining [facility]’s FollowMyHealth portal, you will also be able to view your health information using other applications (apps) compatible with our system.

## 2022-09-26 NOTE — PROGRESS NOTE ADULT - PROBLEM SELECTOR PLAN 3
Unknown history of CKD vs KATHY iso dehydration/prerenal  BUN/Cr ratio - 25  - Cr - 1.32 -->1.15  - Holding valsartan in setting of ? KATHY  - encourage PO intake  - trend Cr

## 2022-09-26 NOTE — DISCHARGE NOTE PROVIDER - NSDCMRMEDTOKEN_GEN_ALL_CORE_FT
Jardiance: 25 milligram(s) orally once a day  rosuvastatin: 20 milligram(s) orally once a day  Xarelto 2.5 mg oral tablet: 1 tab(s) orally 2 times a day

## 2022-09-26 NOTE — DISCHARGE NOTE PROVIDER - NSDCFUADDINST_GEN_ALL_CORE_FT
Please hold taking valsartan and chlorthalidone at discharge until you are able to go see your PCP within a week. Your PCP is aware of this change and expecting you to follow up in clinic for evaluation of your BP and medications as needed.

## 2022-09-26 NOTE — DISCHARGE NOTE PROVIDER - NSDCCPTREATMENT_GEN_ALL_CORE_FT
PRINCIPAL PROCEDURE  Procedure: Transthoracic echocardiogram  Findings and Treatment: Ejection Fraction (Modified Tabor Rule): 65 %  ------------------------------------------------------------------------  OBSERVATIONS:  Mitral Valve: Mitral annular calcification, otherwise  normal mitral valve. Mild mitral regurgitation.  Aortic Root: Normal aortic root.  Aortic Valve: Calcified trileaflet aortic valve with normal  opening.  Left Atrium: Normal left atrium.  LA volume index = 25  cc/m2.  Left Ventricle: Normal left ventricular systolic function.  No segmental wall motion abnormalities. Normal left  ventricular internal dimensions and wall thicknesses.  Reversal of the E-A  waves of the mitral inflow pattern is  consistent with diastolic LV dysfunction.  Right Heart: Normal right atrium. Normal right ventricular  size and function. Normal tricuspid valve. Mild tricuspid  regurgitation. Normal pulmonic valve.  Pericardium/PleuraNormal pericardium with no pericardial  effusion.  Hemodynamic: Estimated right ventricular systolic pressure  equals 34 mm Hg, assuming right atrial pressure equals 10  mm Hg, consistent with normal pulmonary pressures.  ------------------------------------------------------------------------  CONCLUSIONS:  1. Normal left ventricular systolic function. No segmental  wall motion abnormalities.  2. Reversal of the E-A  waves of the mitral inflow pattern  is consistent with diastolic LV dysfunction.  3. Normal right ventricular size and function.

## 2022-09-26 NOTE — PROGRESS NOTE ADULT - ASSESSMENT
82 year old woman with  HTN, T2DM, HLD, on Xarelto who is here for witnessed syncope occurring at 5 PM on 9/24/22. Syncope likely 2/2 vasovagal given history.

## 2022-09-26 NOTE — PROGRESS NOTE ADULT - PROBLEM SELECTOR PLAN 7
- Fluids: PO  - Electrolytes: Will replete to maintain K>4, Phos>3, and Mag>2  - Nutrition: DASH/CC  - Activity: PT   - DVT Prophylaxis: heparin  - Stress Ulcer/GI Prophylaxis: NA  - Disposition: admit to medicine - Fluids: PO  - Electrolytes: Will replete to maintain K>4, Phos>3, and Mag>2  - Nutrition: DASH/CC  - Activity: PT   - DVT Prophylaxis: heparin  - Stress Ulcer/GI Prophylaxis: NA  - Disposition: to home with home services

## 2023-01-26 ENCOUNTER — APPOINTMENT (OUTPATIENT)
Dept: OPHTHALMOLOGY | Facility: CLINIC | Age: 83
End: 2023-01-26
Payer: MEDICARE

## 2023-01-26 ENCOUNTER — NON-APPOINTMENT (OUTPATIENT)
Age: 83
End: 2023-01-26

## 2023-01-26 PROBLEM — E78.5 HYPERLIPIDEMIA, UNSPECIFIED: Chronic | Status: ACTIVE | Noted: 2022-09-24

## 2023-01-26 PROBLEM — I10 ESSENTIAL (PRIMARY) HYPERTENSION: Chronic | Status: ACTIVE | Noted: 2022-09-24

## 2023-01-26 PROBLEM — E11.9 TYPE 2 DIABETES MELLITUS WITHOUT COMPLICATIONS: Chronic | Status: ACTIVE | Noted: 2022-09-24

## 2023-01-26 PROCEDURE — 92133 CPTRZD OPH DX IMG PST SGM ON: CPT

## 2023-01-26 PROCEDURE — 92020 GONIOSCOPY: CPT

## 2023-01-26 PROCEDURE — 92083 EXTENDED VISUAL FIELD XM: CPT

## 2023-01-26 PROCEDURE — 92012 INTRM OPH EXAM EST PATIENT: CPT

## 2023-05-25 ENCOUNTER — NON-APPOINTMENT (OUTPATIENT)
Age: 83
End: 2023-05-25

## 2023-05-25 ENCOUNTER — APPOINTMENT (OUTPATIENT)
Dept: OPHTHALMOLOGY | Facility: CLINIC | Age: 83
End: 2023-05-25
Payer: MEDICARE

## 2023-05-25 PROCEDURE — 92250 FUNDUS PHOTOGRAPHY W/I&R: CPT

## 2023-05-25 PROCEDURE — 92014 COMPRE OPH EXAM EST PT 1/>: CPT

## 2023-09-04 ENCOUNTER — INPATIENT (INPATIENT)
Facility: HOSPITAL | Age: 83
LOS: 1 days | Discharge: HOME | End: 2023-09-06
Attending: INTERNAL MEDICINE | Admitting: INTERNAL MEDICINE
Payer: MEDICARE

## 2023-09-04 VITALS
OXYGEN SATURATION: 100 % | DIASTOLIC BLOOD PRESSURE: 53 MMHG | TEMPERATURE: 98 F | HEART RATE: 68 BPM | SYSTOLIC BLOOD PRESSURE: 91 MMHG | RESPIRATION RATE: 18 BRPM

## 2023-09-04 DIAGNOSIS — N17.9 ACUTE KIDNEY FAILURE, UNSPECIFIED: ICD-10-CM

## 2023-09-04 DIAGNOSIS — R74.01 ELEVATION OF LEVELS OF LIVER TRANSAMINASE LEVELS: ICD-10-CM

## 2023-09-04 DIAGNOSIS — Z29.9 ENCOUNTER FOR PROPHYLACTIC MEASURES, UNSPECIFIED: ICD-10-CM

## 2023-09-04 DIAGNOSIS — E87.6 HYPOKALEMIA: ICD-10-CM

## 2023-09-04 DIAGNOSIS — N39.0 URINARY TRACT INFECTION, SITE NOT SPECIFIED: ICD-10-CM

## 2023-09-04 DIAGNOSIS — Z98.49 CATARACT EXTRACTION STATUS, UNSPECIFIED EYE: Chronic | ICD-10-CM

## 2023-09-04 DIAGNOSIS — D64.9 ANEMIA, UNSPECIFIED: ICD-10-CM

## 2023-09-04 DIAGNOSIS — R55 SYNCOPE AND COLLAPSE: ICD-10-CM

## 2023-09-04 DIAGNOSIS — R94.31 ABNORMAL ELECTROCARDIOGRAM [ECG] [EKG]: ICD-10-CM

## 2023-09-04 DIAGNOSIS — I10 ESSENTIAL (PRIMARY) HYPERTENSION: ICD-10-CM

## 2023-09-04 DIAGNOSIS — E11.9 TYPE 2 DIABETES MELLITUS WITHOUT COMPLICATIONS: ICD-10-CM

## 2023-09-04 LAB
ALBUMIN SERPL ELPH-MCNC: 4.1 G/DL — SIGNIFICANT CHANGE UP (ref 3.3–5)
ALP SERPL-CCNC: 75 U/L — SIGNIFICANT CHANGE UP (ref 40–120)
ALT FLD-CCNC: 193 U/L — HIGH (ref 4–33)
ANION GAP SERPL CALC-SCNC: 12 MMOL/L — SIGNIFICANT CHANGE UP (ref 7–14)
APPEARANCE UR: ABNORMAL
AST SERPL-CCNC: 130 U/L — HIGH (ref 4–32)
BASOPHILS # BLD AUTO: 0.06 K/UL — SIGNIFICANT CHANGE UP (ref 0–0.2)
BASOPHILS NFR BLD AUTO: 0.7 % — SIGNIFICANT CHANGE UP (ref 0–2)
BILIRUB SERPL-MCNC: 0.7 MG/DL — SIGNIFICANT CHANGE UP (ref 0.2–1.2)
BILIRUB UR-MCNC: NEGATIVE — SIGNIFICANT CHANGE UP
BLOOD GAS VENOUS COMPREHENSIVE RESULT: SIGNIFICANT CHANGE UP
BUN SERPL-MCNC: 27 MG/DL — HIGH (ref 7–23)
CALCIUM SERPL-MCNC: 9.4 MG/DL — SIGNIFICANT CHANGE UP (ref 8.4–10.5)
CHLORIDE SERPL-SCNC: 97 MMOL/L — LOW (ref 98–107)
CK SERPL-CCNC: 267 U/L — HIGH (ref 25–170)
CO2 SERPL-SCNC: 26 MMOL/L — SIGNIFICANT CHANGE UP (ref 22–31)
COLOR SPEC: YELLOW — SIGNIFICANT CHANGE UP
CREAT ?TM UR-MCNC: 62 MG/DL — SIGNIFICANT CHANGE UP
CREAT SERPL-MCNC: 1.62 MG/DL — HIGH (ref 0.5–1.3)
D DIMER BLD IA.RAPID-MCNC: <150 NG/ML DDU — SIGNIFICANT CHANGE UP
DIFF PNL FLD: ABNORMAL
EGFR: 31 ML/MIN/1.73M2 — LOW
EOSINOPHIL # BLD AUTO: 0.16 K/UL — SIGNIFICANT CHANGE UP (ref 0–0.5)
EOSINOPHIL NFR BLD AUTO: 1.8 % — SIGNIFICANT CHANGE UP (ref 0–6)
GLUCOSE BLDC GLUCOMTR-MCNC: 93 MG/DL — SIGNIFICANT CHANGE UP (ref 70–99)
GLUCOSE SERPL-MCNC: 141 MG/DL — HIGH (ref 70–99)
GLUCOSE UR QL: >=1000 MG/DL
HCT VFR BLD CALC: 33.7 % — LOW (ref 34.5–45)
HGB BLD-MCNC: 11.3 G/DL — LOW (ref 11.5–15.5)
IANC: 4.22 K/UL — SIGNIFICANT CHANGE UP (ref 1.8–7.4)
IMM GRANULOCYTES NFR BLD AUTO: 0.6 % — SIGNIFICANT CHANGE UP (ref 0–0.9)
KETONES UR-MCNC: ABNORMAL MG/DL
LEUKOCYTE ESTERASE UR-ACNC: ABNORMAL
LYMPHOCYTES # BLD AUTO: 3.47 K/UL — HIGH (ref 1–3.3)
LYMPHOCYTES # BLD AUTO: 39.8 % — SIGNIFICANT CHANGE UP (ref 13–44)
MAGNESIUM SERPL-MCNC: 2.2 MG/DL — SIGNIFICANT CHANGE UP (ref 1.6–2.6)
MCHC RBC-ENTMCNC: 28.3 PG — SIGNIFICANT CHANGE UP (ref 27–34)
MCHC RBC-ENTMCNC: 33.5 GM/DL — SIGNIFICANT CHANGE UP (ref 32–36)
MCV RBC AUTO: 84.5 FL — SIGNIFICANT CHANGE UP (ref 80–100)
MONOCYTES # BLD AUTO: 0.76 K/UL — SIGNIFICANT CHANGE UP (ref 0–0.9)
MONOCYTES NFR BLD AUTO: 8.7 % — SIGNIFICANT CHANGE UP (ref 2–14)
NEUTROPHILS # BLD AUTO: 4.22 K/UL — SIGNIFICANT CHANGE UP (ref 1.8–7.4)
NEUTROPHILS NFR BLD AUTO: 48.4 % — SIGNIFICANT CHANGE UP (ref 43–77)
NITRITE UR-MCNC: NEGATIVE — SIGNIFICANT CHANGE UP
NRBC # BLD: 0 /100 WBCS — SIGNIFICANT CHANGE UP (ref 0–0)
NRBC # FLD: 0 K/UL — SIGNIFICANT CHANGE UP (ref 0–0)
NT-PROBNP SERPL-SCNC: 46 PG/ML — SIGNIFICANT CHANGE UP
PH UR: 7.5 — SIGNIFICANT CHANGE UP (ref 5–8)
PLATELET # BLD AUTO: 310 K/UL — SIGNIFICANT CHANGE UP (ref 150–400)
POTASSIUM SERPL-MCNC: 3.1 MMOL/L — LOW (ref 3.5–5.3)
POTASSIUM SERPL-SCNC: 3.1 MMOL/L — LOW (ref 3.5–5.3)
PROT ?TM UR-MCNC: 59 MG/DL — SIGNIFICANT CHANGE UP
PROT SERPL-MCNC: 7.1 G/DL — SIGNIFICANT CHANGE UP (ref 6–8.3)
PROT UR-MCNC: 100 MG/DL
PROT/CREAT UR-RTO: 1 RATIO — HIGH (ref 0–0.2)
RBC # BLD: 3.99 M/UL — SIGNIFICANT CHANGE UP (ref 3.8–5.2)
RBC # FLD: 14.3 % — SIGNIFICANT CHANGE UP (ref 10.3–14.5)
SODIUM SERPL-SCNC: 135 MMOL/L — SIGNIFICANT CHANGE UP (ref 135–145)
SODIUM UR-SCNC: 70 MMOL/L — SIGNIFICANT CHANGE UP
SP GR SPEC: 1.02 — SIGNIFICANT CHANGE UP (ref 1–1.03)
TROPONIN T, HIGH SENSITIVITY RESULT: 14 NG/L — SIGNIFICANT CHANGE UP
TROPONIN T, HIGH SENSITIVITY RESULT: 15 NG/L — SIGNIFICANT CHANGE UP
TSH SERPL-MCNC: 2.96 UIU/ML — SIGNIFICANT CHANGE UP (ref 0.27–4.2)
UROBILINOGEN FLD QL: 1 MG/DL — SIGNIFICANT CHANGE UP (ref 0.2–1)
UUN UR-MCNC: 483 MG/DL — SIGNIFICANT CHANGE UP
WBC # BLD: 8.72 K/UL — SIGNIFICANT CHANGE UP (ref 3.8–10.5)
WBC # FLD AUTO: 8.72 K/UL — SIGNIFICANT CHANGE UP (ref 3.8–10.5)

## 2023-09-04 PROCEDURE — 99223 1ST HOSP IP/OBS HIGH 75: CPT

## 2023-09-04 PROCEDURE — 99285 EMERGENCY DEPT VISIT HI MDM: CPT | Mod: GC

## 2023-09-04 PROCEDURE — 70450 CT HEAD/BRAIN W/O DYE: CPT | Mod: 26

## 2023-09-04 PROCEDURE — 71045 X-RAY EXAM CHEST 1 VIEW: CPT | Mod: 26

## 2023-09-04 RX ORDER — ATORVASTATIN CALCIUM 80 MG/1
80 TABLET, FILM COATED ORAL AT BEDTIME
Refills: 0 | Status: DISCONTINUED | OUTPATIENT
Start: 2023-09-04 | End: 2023-09-04

## 2023-09-04 RX ORDER — DEXTROSE 50 % IN WATER 50 %
25 SYRINGE (ML) INTRAVENOUS ONCE
Refills: 0 | Status: DISCONTINUED | OUTPATIENT
Start: 2023-09-04 | End: 2023-09-06

## 2023-09-04 RX ORDER — DULAGLUTIDE 4.5 MG/.5ML
4.5 INJECTION, SOLUTION SUBCUTANEOUS
Refills: 0 | DISCHARGE

## 2023-09-04 RX ORDER — SODIUM CHLORIDE 9 MG/ML
1000 INJECTION, SOLUTION INTRAVENOUS
Refills: 0 | Status: DISCONTINUED | OUTPATIENT
Start: 2023-09-04 | End: 2023-09-06

## 2023-09-04 RX ORDER — DORZOLAMIDE HYDROCHLORIDE 20 MG/ML
1 SOLUTION/ DROPS OPHTHALMIC
Refills: 0 | DISCHARGE

## 2023-09-04 RX ORDER — GLUCAGON INJECTION, SOLUTION 0.5 MG/.1ML
1 INJECTION, SOLUTION SUBCUTANEOUS ONCE
Refills: 0 | Status: DISCONTINUED | OUTPATIENT
Start: 2023-09-04 | End: 2023-09-06

## 2023-09-04 RX ORDER — RIVAROXABAN 15 MG-20MG
2.5 KIT ORAL
Refills: 0 | Status: DISCONTINUED | OUTPATIENT
Start: 2023-09-04 | End: 2023-09-04

## 2023-09-04 RX ORDER — LATANOPROST 0.05 MG/ML
1 SOLUTION/ DROPS OPHTHALMIC; TOPICAL AT BEDTIME
Refills: 0 | Status: DISCONTINUED | OUTPATIENT
Start: 2023-09-04 | End: 2023-09-06

## 2023-09-04 RX ORDER — CEFTRIAXONE 500 MG/1
1000 INJECTION, POWDER, FOR SOLUTION INTRAMUSCULAR; INTRAVENOUS EVERY 24 HOURS
Refills: 0 | Status: COMPLETED | OUTPATIENT
Start: 2023-09-04 | End: 2023-09-06

## 2023-09-04 RX ORDER — EMPAGLIFLOZIN 10 MG/1
25 TABLET, FILM COATED ORAL
Qty: 0 | Refills: 0 | DISCHARGE

## 2023-09-04 RX ORDER — EZETIMIBE 10 MG/1
1 TABLET ORAL
Refills: 0 | DISCHARGE

## 2023-09-04 RX ORDER — RIVAROXABAN 15 MG-20MG
1 KIT ORAL
Qty: 0 | Refills: 0 | DISCHARGE

## 2023-09-04 RX ORDER — BRIMONIDINE TARTRATE, TIMOLOL MALEATE 2; 5 MG/ML; MG/ML
1 SOLUTION/ DROPS OPHTHALMIC
Refills: 0 | DISCHARGE

## 2023-09-04 RX ORDER — INSULIN LISPRO 100/ML
VIAL (ML) SUBCUTANEOUS
Refills: 0 | Status: DISCONTINUED | OUTPATIENT
Start: 2023-09-04 | End: 2023-09-06

## 2023-09-04 RX ORDER — DEXTROSE 50 % IN WATER 50 %
12.5 SYRINGE (ML) INTRAVENOUS ONCE
Refills: 0 | Status: DISCONTINUED | OUTPATIENT
Start: 2023-09-04 | End: 2023-09-06

## 2023-09-04 RX ORDER — SODIUM CHLORIDE 9 MG/ML
1000 INJECTION INTRAMUSCULAR; INTRAVENOUS; SUBCUTANEOUS ONCE
Refills: 0 | Status: COMPLETED | OUTPATIENT
Start: 2023-09-04 | End: 2023-09-04

## 2023-09-04 RX ORDER — ROSUVASTATIN CALCIUM 5 MG/1
20 TABLET ORAL
Qty: 0 | Refills: 0 | DISCHARGE

## 2023-09-04 RX ORDER — ROSUVASTATIN CALCIUM 5 MG/1
1 TABLET ORAL
Refills: 0 | DISCHARGE

## 2023-09-04 RX ORDER — DEXTROSE 50 % IN WATER 50 %
15 SYRINGE (ML) INTRAVENOUS ONCE
Refills: 0 | Status: DISCONTINUED | OUTPATIENT
Start: 2023-09-04 | End: 2023-09-06

## 2023-09-04 RX ORDER — DORZOLAMIDE HYDROCHLORIDE 20 MG/ML
1 SOLUTION/ DROPS OPHTHALMIC
Refills: 0 | Status: DISCONTINUED | OUTPATIENT
Start: 2023-09-04 | End: 2023-09-06

## 2023-09-04 RX ORDER — ONDANSETRON 8 MG/1
4 TABLET, FILM COATED ORAL EVERY 8 HOURS
Refills: 0 | Status: DISCONTINUED | OUTPATIENT
Start: 2023-09-04 | End: 2023-09-06

## 2023-09-04 RX ORDER — SODIUM CHLORIDE 9 MG/ML
1000 INJECTION, SOLUTION INTRAVENOUS
Refills: 0 | Status: COMPLETED | OUTPATIENT
Start: 2023-09-04 | End: 2023-09-04

## 2023-09-04 RX ORDER — LATANOPROST 0.05 MG/ML
1 SOLUTION/ DROPS OPHTHALMIC; TOPICAL
Refills: 0 | DISCHARGE

## 2023-09-04 RX ORDER — POTASSIUM CHLORIDE 20 MEQ
40 PACKET (EA) ORAL ONCE
Refills: 0 | Status: COMPLETED | OUTPATIENT
Start: 2023-09-04 | End: 2023-09-04

## 2023-09-04 RX ORDER — ACETAMINOPHEN 500 MG
650 TABLET ORAL EVERY 6 HOURS
Refills: 0 | Status: DISCONTINUED | OUTPATIENT
Start: 2023-09-04 | End: 2023-09-06

## 2023-09-04 RX ORDER — LANOLIN ALCOHOL/MO/W.PET/CERES
3 CREAM (GRAM) TOPICAL AT BEDTIME
Refills: 0 | Status: DISCONTINUED | OUTPATIENT
Start: 2023-09-04 | End: 2023-09-06

## 2023-09-04 RX ADMIN — SODIUM CHLORIDE 1000 MILLILITER(S): 9 INJECTION INTRAMUSCULAR; INTRAVENOUS; SUBCUTANEOUS at 13:26

## 2023-09-04 RX ADMIN — Medication 40 MILLIEQUIVALENT(S): at 14:58

## 2023-09-04 RX ADMIN — SODIUM CHLORIDE 75 MILLILITER(S): 9 INJECTION, SOLUTION INTRAVENOUS at 18:15

## 2023-09-04 RX ADMIN — CEFTRIAXONE 100 MILLIGRAM(S): 500 INJECTION, POWDER, FOR SOLUTION INTRAMUSCULAR; INTRAVENOUS at 22:10

## 2023-09-04 NOTE — ED ADULT NURSE NOTE - NS ED NURSE RECORD ANOTHER HT AND WT
Yes Rib Fracture    WHAT YOU NEED TO KNOW:    A rib fracture is a crack or break in a rib bone. Rib fractures usually heal within 6 weeks. You should be able to return to normal activities before that time. Do not wrap anything around your body to try to splint your ribs. This can prevent you from taking deep breaths and increases your risk for pneumonia.Rib Cage         DISCHARGE INSTRUCTIONS:    Call 911 for any of the following:     You have trouble breathing.      You have new or increased pain.    Return to the emergency department if:     Your pain does not get better, even after treatment.      You have a fever or a cough.     Contact your healthcare provider if:     You have questions or concerns about your condition or care.        Medicines:     NSAIDs help decrease swelling and pain. NSAIDs are available without a doctor's order. Ask your healthcare provider which medicine is right for you. Ask how much to take and when to take it. Take as directed. NSAIDs can cause stomach bleeding and kidney problems if not taken correctly.      Prescription pain medicine may be given. Ask your healthcare provider how to take this medicine safely. Some prescription pain medicines contain acetaminophen. Do not take other medicines that contain acetaminophen without talking to your healthcare provider. Too much acetaminophen may cause liver damage. Prescription pain medicine may cause constipation. Ask your healthcare provider how to prevent or treat constipation.       Take your medicine as directed. Contact your healthcare provider if you think your medicine is not helping or if you have side effects. Tell him or her if you are allergic to any medicine. Keep a list of the medicines, vitamins, and herbs you take. Include the amounts, and when and why you take them. Bring the list or the pill bottles to follow-up visits. Carry your medicine list with you in case of an emergency.    Follow up with your healthcare provider as directed: Write down your questions so you remember to ask them during your visits.    Deep breathing: Deep breathing will decrease your risk for pneumonia. Hug a pillow on the injured side while doing this exercise, to decrease pain. Take a deep breath and hold it for as long as possible. You should let the air out and then cough strongly. Deep breaths help open your airway. You may be given an incentive spirometer to help take deep breaths. Put the plastic piece in your mouth. Take a slow, deep breath. You should then let the air out and cough. Repeat these steps 10 times every hour.    Rest: Rest and limit activity to decrease swelling and pain, and allow your injury to heal. Avoid activities that may cause more pain or damage to your ribs such as, pulling, pushing, and lifting. As your pain decreases, begin movements slowly. Take short walks between rest periods.    Ice: Apply ice on the fractured area for 15 to 20 minutes every hour or as directed. Use an ice pack or put crushed ice in a plastic bag. Cover it with a towel. Ice helps prevent tissue damage and decreases swelling and pain.

## 2023-09-04 NOTE — H&P ADULT - NSHPLABSRESULTS_GEN_ALL_CORE
11.3   8.72  )-----------( 310      ( 04 Sep 2023 13:24 )             33.7     Hgb Trend: 11.3<--  09-04    135  |  97<L>  |  27<H>  ----------------------------<  141<H>  3.1<L>   |  26  |  1.62<H>    Ca    9.4      04 Sep 2023 13:24  Mg     2.20     09-04    TPro  7.1  /  Alb  4.1  /  TBili  0.7  /  DBili  x   /  AST  130<H>  /  ALT  193<H>  /  AlkPhos  75  09-04    Creatinine Trend: 1.62<--        Urinalysis Basic - ( 04 Sep 2023 13:24 )    Color: x / Appearance: x / SG: x / pH: x  Gluc: 141 mg/dL / Ketone: x  / Bili: x / Urobili: x   Blood: x / Protein: x / Nitrite: x   Leuk Esterase: x / RBC: x / WBC x   Sq Epi: x / Non Sq Epi: x / Bacteria: x        EKG is reviewed by me       TTE is ordered by me

## 2023-09-04 NOTE — ED PROVIDER NOTE - CLINICAL SUMMARY MEDICAL DECISION MAKING FREE TEXT BOX
Malcolm PGY3: 82yo F with PMH of HTN, HLD, Afib on eliquis presents to ED for syncope. Witnessed, assoc with hot shower. Differential Diagnosis includes but not limited to orthostatic vs vasovagal vs cardiac arrythmia vs cardiac ACS. Plan for labs including trop, BNP. EKG accelerated junctional similar to prior EKG. No active CP. BP improved here. No sxs of infection.

## 2023-09-04 NOTE — H&P ADULT - PROBLEM SELECTOR PLAN 5
C/w ISS and FS   -F/u with HgbA1c Hgb decreased to 11.3  -No reports of bleeding as per patient   -F/u with iron studies   -Trend Hgb daily ASt/ALT elevated   -no abdominal pain/tenderness on exam   -F/u with hepatitis panel   -F/u with US abdomen  -Hold atorvastatin Patient with junctional rhythm then to sinus rhythm vs. afib   -Denies any chest pain or palpitations. But had a syncopal episode   -EP needs to be consulted in the AM given EKG changes and syncope

## 2023-09-04 NOTE — H&P ADULT - NSHPPHYSICALEXAM_GEN_ALL_CORE
Vital Signs Last 24 Hrs  T(C): 36.4 (04 Sep 2023 17:13), Max: 36.7 (04 Sep 2023 12:56)  T(F): 97.6 (04 Sep 2023 17:13), Max: 98 (04 Sep 2023 12:56)  HR: 87 (04 Sep 2023 17:13) (65 - 87)  BP: 107/73 (04 Sep 2023 17:13) (91/53 - 111/56)  BP(mean): --  RR: 16 (04 Sep 2023 17:13) (16 - 18)  SpO2: 100% (04 Sep 2023 17:13) (100% - 100%)    Parameters below as of 04 Sep 2023 17:13  Patient On (Oxygen Delivery Method): room air    GENERAL: NAD, well-developed  HEENT:  Atraumatic, Normocephalic, EOMI, PERRLA, conjunctiva and sclera clear, oral mucosa moist, clear w/o any exudate   NECK: Supple, No JVD  CHEST/LUNG: Clear to auscultation bilaterally; No wheeze  HEART: Regular rate and rhythm; No murmurs, rubs, or gallops  ABDOMEN: Soft, Nontender, Nondistended; Bowel sounds present  EXTREMITIES:  2+ Peripheral Pulses, No clubbing, cyanosis, or edema  PSYCH: AAOx3  NEUROLOGY: cranial nerve 2-12 grossly intact. Strength 5/5 in UE and LE. sensation grossly intact.  SKIN: No rashes or lesions

## 2023-09-04 NOTE — H&P ADULT - PROBLEM SELECTOR PLAN 6
Hold amlodipine and valsartan for now due to hypotension C/w ISS and FS   -F/u with HgbA1c Hgb decreased to 11.3  -No reports of bleeding as per patient   -F/u with iron studies   -Trend Hgb daily ASt/ALT elevated   -no abdominal pain/tenderness on exam   -F/u with hepatitis panel   -F/u with US abdomen  -Hold atorvastatin

## 2023-09-04 NOTE — ED ADULT TRIAGE NOTE - CHIEF COMPLAINT QUOTE
patient had a witnessed syncopal episode. Pt denies hitting head. Pt sleeping inbetween care. Pt was hypotensive in 60's. Pt arrives with 18g left forearm NS bolus running. patient denies chest pain sob n/v. PHX DM, HTN.

## 2023-09-04 NOTE — ED PROVIDER NOTE - PHYSICAL EXAMINATION
CONSTITUTIONAL: fatigued elderly patient, answering questions but provides no answers unsolicited.   SKIN: no rashes or lacerations   HEAD: NCAT  NECK: Supple  CARD: RRR  RESP: CTAB  ABD: S/NT no R/G  EXT: no edema  NEURO: Grossly non-focal, moving all limbs, diffusely weak but non-focal.   PSYCH: Cooperative, +flattened facies

## 2023-09-04 NOTE — H&P ADULT - HISTORY OF PRESENT ILLNESS
83 year old woman with  HTN, T2DM, HLD, afib on Xarelto presents to the ED with syncope. Pt is AAOx3 but poor historian. Most of the hx was obtained from the nephew. Patient  became unresponsive while showering this morning. As per nephew, she did not lose consciousness or fall down as he was holding her. She was awake but not responding to any verbal stimuli. It last for few seconds. Then her nephew poured cold water on her head which brought her back to baseline. EMS was called and she was found to have low SBP to 60s. She was then brought to the ED. Currently, pt denies any fever, chills, chest pain, palpitations or LE edema.   In the ED, her vitals were notable for soft BPs. Labs were notable for anemia, hypokalemia, elevated BUN/Scr, elevated AST/ALT. EKG showed NSR with 1st degree block.

## 2023-09-04 NOTE — ED ADULT NURSE NOTE - NSFALLRISKINTERV_ED_ALL_ED

## 2023-09-04 NOTE — H&P ADULT - PROBLEM SELECTOR PLAN 2
Scr elevated to 1.6; baseline is around 1.1   -F/u with UA, urine lytes and US abdomen   -Hold valsartan   Trend Scr daily UA positive for LE, WBCs, and bacteria   -Pt denies any urinary symptoms   -Due to advanced age, will treat with IV ceftriaxone   -F/u with urine cx

## 2023-09-04 NOTE — H&P ADULT - PROBLEM SELECTOR PLAN 10
DVT ppx SCD  On xarelto 2.5 mg BID at home for prevention. Hold due to KATHY    Medication list obtained from sister Mrs May.

## 2023-09-04 NOTE — H&P ADULT - PROBLEM SELECTOR PLAN 8
DVT ppx SCD  On xarelto 2.5 mg BID at home for prevention. Hold due to KATHY    Medication list obtained from sister Mrs May. Hold amlodipine and valsartan for now due to hypotension C/w ISS and FS   -F/u with HgbA1c

## 2023-09-04 NOTE — H&P ADULT - PROBLEM SELECTOR PLAN 4
Hgb decreased to 11.3  -No reports of bleeding as per patient   -F/u with iron studies   -Trend Hgb daily ASt/ALT elevated   -no abdominal pain/tenderness on exam   -F/u with hepatitis panel   -F/u with US abdomen  -Hold atorvastatin K decreased to 3.1   -S/p PO repleition   -F/u K in the AM   -C/w telemonitoring

## 2023-09-04 NOTE — H&P ADULT - PROBLEM SELECTOR PLAN 3
ASt/ALT elevated   -no abdominal pain/tenderness on exam   -F/u with hepatitis panel   -F/u with US abdomen ASt/ALT elevated   -no abdominal pain/tenderness on exam   -F/u with hepatitis panel   -F/u with US abdomen  -Hold atorvastatin Scr elevated to 1.6; baseline is around 1.1   -F/u with UA, urine lytes and US abdomen   -Hold valsartan   Trend Scr daily Scr elevated to 1.6; baseline is around 1.1   -Most likely dehydration vs. UTI   -F/u with UA, urine lytes and US abdomen   -Hold valsartan   -Trend Scr daily

## 2023-09-04 NOTE — H&P ADULT - PROBLEM SELECTOR PLAN 9
DVT ppx SCD  On xarelto 2.5 mg BID at home for prevention. Hold due to KATHY    Medication list obtained from sister Mrs May. Hold amlodipine and valsartan for now due to hypotension

## 2023-09-04 NOTE — H&P ADULT - PROBLEM SELECTOR PLAN 7
DVT ppx SCD    medication list obtained from sister Mrs May. DVT ppx SCD  On xarelto 2.5 mg BID at home for prevention. Hold due to KATHY    Medication list obtained from sister Mrs May. Hold amlodipine and valsartan for now due to hypotension C/w ISS and FS   -F/u with HgbA1c Hgb decreased to 11.3  -No reports of bleeding as per patient   -F/u with iron studies   -Trend Hgb daily

## 2023-09-04 NOTE — ED ADULT NURSE NOTE - OBJECTIVE STATEMENT
Pt received to rm 23 presents s/p syncope episode while getting out of the shower. Pt a&ox4, ambulatory with assistance, skin intact, respirations even and unlabored, abd soft and non-distended, nontender to palpation. pt endorsing nausea, vomited when in room, provider aware. Pt arrives with 18G in left wrist placed by EMS, bolus in progress, second 20g Iv placed in rt arm, labs drawn and sent, NSR on CM. Pt BP soft in room-fluids in progress. Denies chest pain, fever, chills, shortness of breath, dizziness, hitting head during syncope, or any other symptoms.

## 2023-09-04 NOTE — ED PROVIDER NOTE - ATTENDING CONTRIBUTION TO CARE
DR. BLOCH, ATTENDING MD-  I performed a face to face bedside interview with patient regarding history of present illness, review of symptoms and past medical history. I completed an independent physical exam.  I have discussed patient's plan of care with the resident.  Patient examined, ill appearing  oriented but mildly lethargic, laying with eyes closed., NAD HEENT nml heart s1s2, lungs clear, abd soft nontender, extrem no edema. pulses intact.

## 2023-09-04 NOTE — H&P ADULT - PROBLEM SELECTOR PLAN 1
Patient with unresponsive, found to have low SBP at home   -Most likely due to orthostatic hypotension   -DDx also includes hypoglycemia, hypokalemia,  dehydration, vasovagal, arrhythmia, structural heart disease, or TIA   -Unclear what her FS was during initial presentation. Will trend FS   -S/p IVF in the ED, with improvement with BP   -EKG showed junctional rhythm-> repeat sinus rhythm with 1st degree block. C/w telemonitoring   -F/u with TTE   -F/u with D-dimer   -F/u with CT head   -C/w telemonitoring Patient with unresponsive, found to have low SBP at home   -Most likely due to orthostatic hypotension   -DDx also includes hypoglycemia, hypokalemia,  dehydration, vasovagal, arrhythmia, structural heart disease, or TIA   -Unclear what her FS was during initial presentation. Will trend FS   -S/p IVF in the ED, with improvement with BP   -EKG showed junctional rhythm-> repeat sinus rhythm with 1st degree block. Management as below   -F/u with TTE   -F/u with D-dimer   -F/u with CT head   -C/w telemonitoring

## 2023-09-04 NOTE — ED PROVIDER NOTE - OBJECTIVE STATEMENT
84yo F with PMH of HTN, HLD, Afib on eliquis presents to ED for syncope. Was going into hot shower, suddenly said she felt dizzy and then had syncope. Didn't fall, hit head, family caught her, was witnessed. No shaking, tongue bite, or incontinence. Family not sure how long she was out, but sister gave her 'smelling salts' and she woke up. Denies fever, CP, SOB, abd pain, NVDC, LE edema. Hx of syncope in past Sep'22 but w/o clear cause. With EMS SBP initially 60's, received 1L prior to arrival.

## 2023-09-05 LAB
ALBUMIN SERPL ELPH-MCNC: 3.8 G/DL — SIGNIFICANT CHANGE UP (ref 3.3–5)
ALP SERPL-CCNC: 79 U/L — SIGNIFICANT CHANGE UP (ref 40–120)
ALT FLD-CCNC: 148 U/L — HIGH (ref 4–33)
ANION GAP SERPL CALC-SCNC: 8 MMOL/L — SIGNIFICANT CHANGE UP (ref 7–14)
AST SERPL-CCNC: 72 U/L — HIGH (ref 4–32)
BASOPHILS # BLD AUTO: 0.04 K/UL — SIGNIFICANT CHANGE UP (ref 0–0.2)
BASOPHILS NFR BLD AUTO: 0.5 % — SIGNIFICANT CHANGE UP (ref 0–2)
BILIRUB SERPL-MCNC: 0.6 MG/DL — SIGNIFICANT CHANGE UP (ref 0.2–1.2)
BUN SERPL-MCNC: 22 MG/DL — SIGNIFICANT CHANGE UP (ref 7–23)
CALCIUM SERPL-MCNC: 9.7 MG/DL — SIGNIFICANT CHANGE UP (ref 8.4–10.5)
CHLORIDE SERPL-SCNC: 98 MMOL/L — SIGNIFICANT CHANGE UP (ref 98–107)
CHOLEST SERPL-MCNC: 92 MG/DL — SIGNIFICANT CHANGE UP
CO2 SERPL-SCNC: 28 MMOL/L — SIGNIFICANT CHANGE UP (ref 22–31)
CREAT SERPL-MCNC: 1.3 MG/DL — SIGNIFICANT CHANGE UP (ref 0.5–1.3)
CULTURE RESULTS: SIGNIFICANT CHANGE UP
EGFR: 41 ML/MIN/1.73M2 — LOW
EOSINOPHIL # BLD AUTO: 0.1 K/UL — SIGNIFICANT CHANGE UP (ref 0–0.5)
EOSINOPHIL NFR BLD AUTO: 1.1 % — SIGNIFICANT CHANGE UP (ref 0–6)
FERRITIN SERPL-MCNC: 618 NG/ML — HIGH (ref 13–330)
GLUCOSE BLDC GLUCOMTR-MCNC: 108 MG/DL — HIGH (ref 70–99)
GLUCOSE BLDC GLUCOMTR-MCNC: 81 MG/DL — SIGNIFICANT CHANGE UP (ref 70–99)
GLUCOSE BLDC GLUCOMTR-MCNC: 89 MG/DL — SIGNIFICANT CHANGE UP (ref 70–99)
GLUCOSE BLDC GLUCOMTR-MCNC: 90 MG/DL — SIGNIFICANT CHANGE UP (ref 70–99)
GLUCOSE SERPL-MCNC: 79 MG/DL — SIGNIFICANT CHANGE UP (ref 70–99)
HAV IGM SER-ACNC: SIGNIFICANT CHANGE UP
HBV CORE IGM SER-ACNC: SIGNIFICANT CHANGE UP
HBV SURFACE AG SER-ACNC: SIGNIFICANT CHANGE UP
HCT VFR BLD CALC: 35.1 % — SIGNIFICANT CHANGE UP (ref 34.5–45)
HCV AB S/CO SERPL IA: 0.1 S/CO — SIGNIFICANT CHANGE UP (ref 0–0.99)
HCV AB SERPL-IMP: SIGNIFICANT CHANGE UP
HDLC SERPL-MCNC: 29 MG/DL — LOW
HGB BLD-MCNC: 11.7 G/DL — SIGNIFICANT CHANGE UP (ref 11.5–15.5)
IANC: 5.32 K/UL — SIGNIFICANT CHANGE UP (ref 1.8–7.4)
IMM GRANULOCYTES NFR BLD AUTO: 0.2 % — SIGNIFICANT CHANGE UP (ref 0–0.9)
IRON SATN MFR SERPL: 29 % — SIGNIFICANT CHANGE UP (ref 14–50)
IRON SATN MFR SERPL: 70 UG/DL — SIGNIFICANT CHANGE UP (ref 30–160)
LIPID PNL WITH DIRECT LDL SERPL: 46 MG/DL — SIGNIFICANT CHANGE UP
LYMPHOCYTES # BLD AUTO: 2.48 K/UL — SIGNIFICANT CHANGE UP (ref 1–3.3)
LYMPHOCYTES # BLD AUTO: 28.4 % — SIGNIFICANT CHANGE UP (ref 13–44)
MCHC RBC-ENTMCNC: 28.6 PG — SIGNIFICANT CHANGE UP (ref 27–34)
MCHC RBC-ENTMCNC: 33.3 GM/DL — SIGNIFICANT CHANGE UP (ref 32–36)
MCV RBC AUTO: 85.8 FL — SIGNIFICANT CHANGE UP (ref 80–100)
MONOCYTES # BLD AUTO: 0.78 K/UL — SIGNIFICANT CHANGE UP (ref 0–0.9)
MONOCYTES NFR BLD AUTO: 8.9 % — SIGNIFICANT CHANGE UP (ref 2–14)
NEUTROPHILS # BLD AUTO: 5.32 K/UL — SIGNIFICANT CHANGE UP (ref 1.8–7.4)
NEUTROPHILS NFR BLD AUTO: 60.9 % — SIGNIFICANT CHANGE UP (ref 43–77)
NON HDL CHOLESTEROL: 63 MG/DL — SIGNIFICANT CHANGE UP
NRBC # BLD: 0 /100 WBCS — SIGNIFICANT CHANGE UP (ref 0–0)
NRBC # FLD: 0 K/UL — SIGNIFICANT CHANGE UP (ref 0–0)
PLATELET # BLD AUTO: 309 K/UL — SIGNIFICANT CHANGE UP (ref 150–400)
POTASSIUM SERPL-MCNC: 4 MMOL/L — SIGNIFICANT CHANGE UP (ref 3.5–5.3)
POTASSIUM SERPL-SCNC: 4 MMOL/L — SIGNIFICANT CHANGE UP (ref 3.5–5.3)
PROT SERPL-MCNC: 7.1 G/DL — SIGNIFICANT CHANGE UP (ref 6–8.3)
RBC # BLD: 4.09 M/UL — SIGNIFICANT CHANGE UP (ref 3.8–5.2)
RBC # FLD: 14.3 % — SIGNIFICANT CHANGE UP (ref 10.3–14.5)
SODIUM SERPL-SCNC: 134 MMOL/L — LOW (ref 135–145)
SPECIMEN SOURCE: SIGNIFICANT CHANGE UP
TIBC SERPL-MCNC: 243 UG/DL — SIGNIFICANT CHANGE UP (ref 220–430)
TRIGL SERPL-MCNC: 85 MG/DL — SIGNIFICANT CHANGE UP
UIBC SERPL-MCNC: 173 UG/DL — SIGNIFICANT CHANGE UP (ref 110–370)
WBC # BLD: 8.74 K/UL — SIGNIFICANT CHANGE UP (ref 3.8–10.5)
WBC # FLD AUTO: 8.74 K/UL — SIGNIFICANT CHANGE UP (ref 3.8–10.5)

## 2023-09-05 PROCEDURE — 93306 TTE W/DOPPLER COMPLETE: CPT | Mod: 26

## 2023-09-05 PROCEDURE — 76700 US EXAM ABDOM COMPLETE: CPT | Mod: 26

## 2023-09-05 RX ORDER — RIVAROXABAN 15 MG-20MG
2.5 KIT ORAL
Refills: 0 | Status: DISCONTINUED | OUTPATIENT
Start: 2023-09-05 | End: 2023-09-06

## 2023-09-05 RX ADMIN — RIVAROXABAN 2.5 MILLIGRAM(S): KIT at 18:36

## 2023-09-05 RX ADMIN — DORZOLAMIDE HYDROCHLORIDE 1 DROP(S): 20 SOLUTION/ DROPS OPHTHALMIC at 08:03

## 2023-09-05 RX ADMIN — CEFTRIAXONE 100 MILLIGRAM(S): 500 INJECTION, POWDER, FOR SOLUTION INTRAMUSCULAR; INTRAVENOUS at 22:32

## 2023-09-05 RX ADMIN — LATANOPROST 1 DROP(S): 0.05 SOLUTION/ DROPS OPHTHALMIC; TOPICAL at 00:00

## 2023-09-05 RX ADMIN — DORZOLAMIDE HYDROCHLORIDE 1 DROP(S): 20 SOLUTION/ DROPS OPHTHALMIC at 00:00

## 2023-09-05 RX ADMIN — DORZOLAMIDE HYDROCHLORIDE 1 DROP(S): 20 SOLUTION/ DROPS OPHTHALMIC at 21:41

## 2023-09-05 RX ADMIN — LATANOPROST 1 DROP(S): 0.05 SOLUTION/ DROPS OPHTHALMIC; TOPICAL at 22:32

## 2023-09-05 NOTE — PROGRESS NOTE ADULT - SUBJECTIVE AND OBJECTIVE BOX
Patient is a 83y old  Female who presents with a chief complaint of     SUBJECTIVE / OVERNIGHT EVENTS:     MEDICATIONS  (STANDING):  cefTRIAXone   IVPB 1000 milliGRAM(s) IV Intermittent every 24 hours  dextrose 5%. 1000 milliLiter(s) (50 mL/Hr) IV Continuous <Continuous>  dextrose 5%. 1000 milliLiter(s) (100 mL/Hr) IV Continuous <Continuous>  dextrose 50% Injectable 12.5 Gram(s) IV Push once  dextrose 50% Injectable 25 Gram(s) IV Push once  dextrose 50% Injectable 25 Gram(s) IV Push once  dorzolamide 2% Ophthalmic Solution 1 Drop(s) Both EYES <User Schedule>  glucagon  Injectable 1 milliGRAM(s) IntraMuscular once  insulin lispro (ADMELOG) corrective regimen sliding scale   SubCutaneous three times a day before meals  latanoprost 0.005% Ophthalmic Solution 1 Drop(s) Right EYE at bedtime  rivaroxaban 2.5 milliGRAM(s) Oral two times a day    MEDICATIONS  (PRN):  acetaminophen     Tablet .. 650 milliGRAM(s) Oral every 6 hours PRN Temp greater or equal to 38C (100.4F), Mild Pain (1 - 3)  aluminum hydroxide/magnesium hydroxide/simethicone Suspension 30 milliLiter(s) Oral every 4 hours PRN Dyspepsia  dextrose Oral Gel 15 Gram(s) Oral once PRN Blood Glucose LESS THAN 70 milliGRAM(s)/deciliter  melatonin 3 milliGRAM(s) Oral at bedtime PRN Insomnia  ondansetron Injectable 4 milliGRAM(s) IV Push every 8 hours PRN Nausea and/or Vomiting      CAPILLARY BLOOD GLUCOSE      POCT Blood Glucose.: 108 mg/dL (05 Sep 2023 17:23)  POCT Blood Glucose.: 90 mg/dL (05 Sep 2023 12:39)  POCT Blood Glucose.: 81 mg/dL (05 Sep 2023 08:56)  POCT Blood Glucose.: 89 mg/dL (05 Sep 2023 02:47)    I&O's Summary    05 Sep 2023 07:01  -  05 Sep 2023 23:56  --------------------------------------------------------  IN: 590 mL / OUT: 550 mL / NET: 40 mL      T(C): 36.4 (09-05-23 @ 13:16), Max: 36.4 (09-05-23 @ 13:16)  HR: 74 (09-05-23 @ 13:16) (74 - 74)  BP: 129/62 (09-05-23 @ 13:16) (129/62 - 129/62)  RR: 17 (09-05-23 @ 13:16) (17 - 17)  SpO2: 100% (09-05-23 @ 13:16) (100% - 100%)    PHYSICAL EXAM:  GENERAL: NAD, well-developed  HEAD:  Atraumatic, Normocephalic  EYES: EOMI, PERRLA, conjunctiva and sclera clear  NECK: Supple, No JVD  CHEST/LUNG: Clear to auscultation bilaterally; No wheeze  HEART: Regular rate and rhythm; No murmurs, rubs, or gallops  ABDOMEN: Soft, Nontender, Nondistended; Bowel sounds present  EXTREMITIES:  2+ Peripheral Pulses, No clubbing, cyanosis, or edema  PSYCH: AAOx3  NEUROLOGY: non-focal  SKIN: No rashes or lesions    LABS:                        11.7   8.74  )-----------( 309      ( 05 Sep 2023 06:25 )             35.1     09-05    134<L>  |  98  |  22  ----------------------------<  79  4.0   |  28  |  1.30    Ca    9.7      05 Sep 2023 06:25  Mg     2.20     09-04    TPro  7.1  /  Alb  3.8  /  TBili  0.6  /  DBili  x   /  AST  72<H>  /  ALT  148<H>  /  AlkPhos  79  09-05      CARDIAC MARKERS ( 04 Sep 2023 16:44 )  x     / x     / 267 U/L / x     / x          Urinalysis Basic - ( 05 Sep 2023 06:25 )    Color: x / Appearance: x / SG: x / pH: x  Gluc: 79 mg/dL / Ketone: x  / Bili: x / Urobili: x   Blood: x / Protein: x / Nitrite: x   Leuk Esterase: x / RBC: x / WBC x   Sq Epi: x / Non Sq Epi: x / Bacteria: x        RADIOLOGY & ADDITIONAL TESTS:    Imaging Personally Reviewed:    Consultant(s) Notes Reviewed:      Care Discussed with Consultants/Other Providers:

## 2023-09-05 NOTE — CONSULT NOTE ADULT - SUBJECTIVE AND OBJECTIVE BOX
Cardiology    HISTORY OF PRESENT ILLNESS: HPI:  83 year old woman with  HTN, T2DM, HLD, on the PAD dose of Xarelto presents to the ED with syncope. Pt is AAOx3 but poor historian. Most of the hx was obtained from the nephew. Patient  became unresponsive while showering this morning. As per nephew, she did not lose consciousness or fall down as he was holding her. She was awake but not responding to any verbal stimuli. It last for few seconds. Then her nephew poured cold water on her head which brought her back to baseline. EMS was called and she was found to have low SBP to 60s. She was then brought to the ED. Currently, pt denies any fever, chills, chest pain, palpitations or LE edema.   In the ED, her vitals were notable for soft BPs. Labs were notable for anemia, hypokalemia, elevated BUN/Scr, elevated AST/ALT. EKG showed NSR with 1st degree block.  (04 Sep 2023 18:28)    Ms Hsieh is a pleasant 82yo woman with HTN, HLD ,and diabetes, here after a few days of worsening appetite and some fatigue, and then feeling severely lightheaded / nearly fainting in the shower. Was able to sit on her shower chair and call for help. States she did not fall or lose consciousness, but felt "spacey" and "not in control" for a short period of time.  She believes her nephew turned off the water.  After she was helped out of the shower, she felt fatigued for ~30 min.  This has happened once before, last year.  Not having any angina, palpitations, orthopnea, or leg swelling. A 10 pt ROS is otherwise negative.    PAST MEDICAL & SURGICAL HISTORY:  HTN (hypertension)  HLD (hyperlipidemia)  T2DM (type 2 diabetes mellitus)  S/P cataract surgery    MEDICATIONS  (STANDING):  cefTRIAXone   IVPB 1000 milliGRAM(s) IV Intermittent every 24 hours  dextrose 5%. 1000 milliLiter(s) (50 mL/Hr) IV Continuous <Continuous>  dextrose 5%. 1000 milliLiter(s) (100 mL/Hr) IV Continuous <Continuous>  dextrose 50% Injectable 25 Gram(s) IV Push once  dextrose 50% Injectable 25 Gram(s) IV Push once  dextrose 50% Injectable 12.5 Gram(s) IV Push once  dorzolamide 2% Ophthalmic Solution 1 Drop(s) Both EYES <User Schedule>  glucagon  Injectable 1 milliGRAM(s) IntraMuscular once  insulin lispro (ADMELOG) corrective regimen sliding scale   SubCutaneous three times a day before meals  latanoprost 0.005% Ophthalmic Solution 1 Drop(s) Right EYE at bedtime  rivaroxaban 2.5 milliGRAM(s) Oral two times a day      Allergies    No Known Allergies    Intolerances    FAMILY HISTORY:  No pertinent family history in first degree relatives      Non-contributary for premature coronary disease or sudden cardiac death    SOCIAL HISTORY:    [ x] Non-smoker  [ ] Smoker  [ ] Alcohol    PHYSICAL EXAM:  T(C): 36.4 (09-05-23 @ 13:16), Max: 36.8 (09-04-23 @ 21:05)  HR: 74 (09-05-23 @ 13:16) (70 - 87)  BP: 129/62 (09-05-23 @ 13:16) (107/73 - 129/62)  RR: 17 (09-05-23 @ 13:16) (16 - 17)  SpO2: 100% (09-05-23 @ 13:16) (100% - 100%)  Wt(kg): --    Appearance: Normal appearing adult woman in no acute distress	  HEENT:   Normal oral mucosa, PERRL, EOMI	  Lymphatic: No lymphadenopathy , no edema  Cardiovascular: Normal S1 S2, No JVD, No murmurs , Peripheral pulses palpable 2+ bilaterally  Respiratory: Lungs clear to auscultation, normal effort 	  Gastrointestinal:  Soft, Non-tender, + BS	  Skin: No rashes, No ecchymoses, No cyanosis, warm to touch  Musculoskeletal: Normal range of motion, normal strength  Psychiatry:  Mood & affect appropriate      TELEMETRY: NSR, sinus chapin 50.  No long pauses	    ECG: Sinus chapin 50.  Echo:  < from: Transthoracic Echocardiogram (09.05.23 @ 13:28) >  DIMENSIONS:  Dimensions:     Normal Values:  LA:     2.9 cm    2.0 - 4.0 cm  Ao:     2.5 cm    2.0 - 3.8 cm  SEPTUM: 0.7 cm    0.6 - 1.2 cm  PWT:    0.7 cm    0.6 - 1.1 cm  LVIDd:  4.4 cm    3.0 - 5.6 cm  LVIDs:  2.7 cm    1.8 - 4.0 cm  Derived Variables:  LVMI: 46 g/m2  RWT: 0.31  Fractional short: 39 %  Ejection Fraction (Tabor Rule): 65 %  ------------------------------------------------------------------------  OBSERVATIONS:  Mitral Valve: Mitral annular calcification, otherwise  normal mitral valve. Minimal mitral regurgitation.  Aortic Root: Normal aortic root.  Aortic Valve: Aortic valve leaflet morphology not well  visualized.  Left Atrium: Normal left atrium.  Left Ventricle: Normal left ventricular systolic function.  No segmental wall motion abnormalities. Normal left  ventricular internal dimensions and wall thicknesses.  Right Heart: Normal right atrium. The right ventricle is  not well visualized; grossly normal right ventricular  systolic function. Normal tricuspid valve. Minimal  tricuspid regurgitation. Normal pulmonic valve.  Pericardium/PleuraNormal pericardium with no pericardial  effusion.    < end of copied text >  	  LABS:	 	                          11.7   8.74  )-----------( 309      ( 05 Sep 2023 06:25 )             35.1     09-05    134<L>  |  98  |  22  ----------------------------<  79  4.0   |  28  |  1.30    Ca    9.7      05 Sep 2023 06:25  Mg     2.20     09-04    TPro  7.1  /  Alb  3.8  /  TBili  0.6  /  DBili  x   /  AST  72<H>  /  ALT  148<H>  /  AlkPhos  79  09-05    ASSESSMENT/PLAN: Ms Hsieh is a very pleasant 83y Female here w/ a presyncopal episode.  Syncope exists on a spectrum, and she apparently was close enough to fainting to have post-event fatigue.  I don't think this was a seizure based on her onset and offset symptoms, and witnessed report from her nephew to receiving team at [facility].  Shes had similar episodes in the past.  Her telemetry/EKG and Echocardiogram are reassuringly normal.  Her blood pressure is stable on no medications at this time.  Last year, was on a few medications.  Unclear why she is on the PAD/CAD stabilizing dose of Xarelto, and not on an Aspirin?  This is NOT the AFib dose of Xarelto (which would be 15-20mg qPM), and the patient states no known history of arrhythmia.  Recommend checking orthostatic vitals.  Recommend ambulatory Holter/MCOT with her Cardiology team (Dr Rodriguez's office).  Will follow with you. At this time, not enough evidence to recommend permanent cardiac pacing.      Refugio Jenkins M.D.  Cardiac Electrophysiology    office [phone]  pager [phone]

## 2023-09-05 NOTE — PATIENT PROFILE ADULT - FALL HARM RISK - HARM RISK INTERVENTIONS
Assistance with ambulation/Assistance OOB with selected safe patient handling equipment/Communicate Risk of Fall with Harm to all staff/Reinforce activity limits and safety measures with patient and family/Tailored Fall Risk Interventions/Use of alarms - bed, chair and/or voice tab/Visual Cue: Yellow wristband and red socks/Bed in lowest position, wheels locked, appropriate side rails in place/Call bell, personal items and telephone in reach/Instruct patient to call for assistance before getting out of bed or chair/Non-slip footwear when patient is out of bed/[place] to call system/Physically safe environment - no spills, clutter or unnecessary equipment/Purposeful Proactive Rounding/Room/bathroom lighting operational, light cord in reach

## 2023-09-06 ENCOUNTER — TRANSCRIPTION ENCOUNTER (OUTPATIENT)
Age: 83
End: 2023-09-06

## 2023-09-06 VITALS
RESPIRATION RATE: 18 BRPM | OXYGEN SATURATION: 100 % | SYSTOLIC BLOOD PRESSURE: 107 MMHG | DIASTOLIC BLOOD PRESSURE: 66 MMHG | TEMPERATURE: 98 F | HEART RATE: 72 BPM

## 2023-09-06 LAB
ALBUMIN SERPL ELPH-MCNC: 3.8 G/DL — SIGNIFICANT CHANGE UP (ref 3.3–5)
ALP SERPL-CCNC: 76 U/L — SIGNIFICANT CHANGE UP (ref 40–120)
ALT FLD-CCNC: 144 U/L — HIGH (ref 4–33)
ANION GAP SERPL CALC-SCNC: 10 MMOL/L — SIGNIFICANT CHANGE UP (ref 7–14)
AST SERPL-CCNC: 79 U/L — HIGH (ref 4–32)
BILIRUB SERPL-MCNC: 0.5 MG/DL — SIGNIFICANT CHANGE UP (ref 0.2–1.2)
BUN SERPL-MCNC: 18 MG/DL — SIGNIFICANT CHANGE UP (ref 7–23)
CALCIUM SERPL-MCNC: 9.3 MG/DL — SIGNIFICANT CHANGE UP (ref 8.4–10.5)
CHLORIDE SERPL-SCNC: 100 MMOL/L — SIGNIFICANT CHANGE UP (ref 98–107)
CO2 SERPL-SCNC: 24 MMOL/L — SIGNIFICANT CHANGE UP (ref 22–31)
CREAT SERPL-MCNC: 1.18 MG/DL — SIGNIFICANT CHANGE UP (ref 0.5–1.3)
EGFR: 46 ML/MIN/1.73M2 — LOW
GLUCOSE BLDC GLUCOMTR-MCNC: 101 MG/DL — HIGH (ref 70–99)
GLUCOSE BLDC GLUCOMTR-MCNC: 77 MG/DL — SIGNIFICANT CHANGE UP (ref 70–99)
GLUCOSE BLDC GLUCOMTR-MCNC: 82 MG/DL — SIGNIFICANT CHANGE UP (ref 70–99)
GLUCOSE BLDC GLUCOMTR-MCNC: 89 MG/DL — SIGNIFICANT CHANGE UP (ref 70–99)
GLUCOSE SERPL-MCNC: 80 MG/DL — SIGNIFICANT CHANGE UP (ref 70–99)
MAGNESIUM SERPL-MCNC: 2.2 MG/DL — SIGNIFICANT CHANGE UP (ref 1.6–2.6)
PHOSPHATE SERPL-MCNC: 2.2 MG/DL — LOW (ref 2.5–4.5)
POTASSIUM SERPL-MCNC: 3.6 MMOL/L — SIGNIFICANT CHANGE UP (ref 3.5–5.3)
POTASSIUM SERPL-SCNC: 3.6 MMOL/L — SIGNIFICANT CHANGE UP (ref 3.5–5.3)
PROT SERPL-MCNC: 7.2 G/DL — SIGNIFICANT CHANGE UP (ref 6–8.3)
SODIUM SERPL-SCNC: 134 MMOL/L — LOW (ref 135–145)

## 2023-09-06 RX ORDER — VALSARTAN 80 MG/1
1 TABLET ORAL
Refills: 0 | DISCHARGE

## 2023-09-06 RX ORDER — AMLODIPINE BESYLATE 2.5 MG/1
1 TABLET ORAL
Refills: 0 | DISCHARGE

## 2023-09-06 RX ADMIN — DORZOLAMIDE HYDROCHLORIDE 1 DROP(S): 20 SOLUTION/ DROPS OPHTHALMIC at 09:34

## 2023-09-06 RX ADMIN — RIVAROXABAN 2.5 MILLIGRAM(S): KIT at 18:22

## 2023-09-06 RX ADMIN — RIVAROXABAN 2.5 MILLIGRAM(S): KIT at 07:02

## 2023-09-06 RX ADMIN — CEFTRIAXONE 100 MILLIGRAM(S): 500 INJECTION, POWDER, FOR SOLUTION INTRAMUSCULAR; INTRAVENOUS at 18:23

## 2023-09-06 NOTE — PROGRESS NOTE ADULT - NS ATTEND AMEND GEN_ALL_CORE FT
inpatient test results and telemetry are reassuring.  outpatient followup w/ Dr Rodriguez's office re: Holter monitoring.  OK for OR planning.

## 2023-09-06 NOTE — PROGRESS NOTE ADULT - PROBLEM SELECTOR PLAN 3
Scr elevated to 1.6; baseline is around 1.1   -Most likely dehydration vs. UTI   -F/u with UA, urine lytes and US abdomen   -Hold valsartan   -Trend Scr daily
Scr elevated to 1.6; baseline is around 1.1   -Most likely dehydration vs. UTI   -F/u with UA, urine lytes and US abdomen   -Hold valsartan   -Trend Scr daily

## 2023-09-06 NOTE — DISCHARGE NOTE PROVIDER - NSDCCPCAREPLAN_GEN_ALL_CORE_FT
PRINCIPAL DISCHARGE DIAGNOSIS  Diagnosis: Syncope  Assessment and Plan of Treatment: you were admitted after a fall, you had a Ct scan of your head which did not show a stroke.  You were treated for a urinary tract infection with IV antibiotics.  Follow up with your primary care provider for continued monitoring.      SECONDARY DISCHARGE DIAGNOSES  Diagnosis: Essential hypertension  Assessment and Plan of Treatment: Your blood pressure medications were stopped because your blood pressure ws normal.  Follow up with your doctor on when to restart    Diagnosis: Type 2 diabetes mellitus  Assessment and Plan of Treatment: Continue consistent carbohydrate diet.  Monitor blood glucose levels throughout the day before meals and at bedtime.  Record blood sugars and bring to outpatient providers appointment in order to be reviewed by your doctor for management modifications.  Be aware of diabetes management symptoms including feeling cool and clammy may be related to low glucose levels.  Feeling hot and dry may indicate high glucose levels.  If  you feel these symptoms, check your blood sugar.  Make regular podiatry appointments in order to have feet checked for wounds and toe nails cut by a doctor to prevent infections.      Diagnosis: Acute UTI  Assessment and Plan of Treatment: You were started on antibiotics for a urinary infection. To help prevent future infections maintain healthy hygiene and avoid taking long baths. Wipe from front to back and empty your bladder frequently by keeping yourself properly hydrated. Monitor for signs/symptoms of infection, such as, fever/chills, burning/pain with urination, urinary frequency/hesitancy, cloudy urine, or blood in urine and follow-up with your primary care provider as outpatient for further care.

## 2023-09-06 NOTE — PHYSICAL THERAPY INITIAL EVALUATION ADULT - NS ASR RISK AREAS PT EVAL
Continuity of Care Document

                            Created on:April 3, 2022



Patient:HARDIK MORE

Sex:Female

:2002

External Reference #:[id number]





Demographics







                          Address                   [address]



                                                    [place] [address]

 

                          Home Phone                [phone]

 

                          Work Phone                [phone]

 

                          Mobile Phone              [phone]

 

                          Email Address             [email or web address]

 

                          Preferred Language        English

 

                          Marital Status            Unknown

 

                          Muslim Affiliation     Unknown

 

                          Race                      Unknown

 

                          Additional Race(s)        White



                                                    Unavailable

 

                          Ethnic Group              Unknown









Author







                          Organization              [facility]

t

 

                          Address                   [address]



                                                    [place] [address]

 

                          Phone                     [phone]









Support







                Name            Relationship    Address         Phone

 

                PHYSICIAN       Primary Care Physician Unavailable     Stefanie Aggarwal MD     Emergency Provider [email or web address]



                                                [place] [address] 

 

                SHAHIDA        Unavailable     [address]      Unavailable



                                                [place] [address] 

 

                SHAHIDA                       [address] [place] [address] [phone]



                                                [place] [address] 









Care Team Providers







                    Name                Role                Phone

 

                    MICHELLE ALLEN            Primary Care Physician Unavailable

 

                    PATSY Baker      Attending Clinician [phone]









wanda



       [facility]

 

       Vaginal Vaginal Disease Active                              [address]

 

       BMI    BMI    Disease Active                              Univers



       30.0-30.9, 30.0-30.9,               1-12                               it

y of



       adult  adult                00:00:                             [address]

 

       Yeast  Yeast  Disease Active                              Univers



       infection infection               1-12                               ity 

of



       of the of the               00:00:                             [place]



       vagina vagina               00                                 [facility]







Allergies, Adverse Reactions, Alerts







       Allergy Allergy Status Severity Reaction(s) Onset  Inactive Treating Comm

ents 

Source



       Name   Type                        Date   Date   Clinician        

 

       NO KNOWN Drug   Active                                           [facility]



       ALLERGIE Class                                                   ity of



       S                                                              [facility]







Social History







           Social Habit Start Date Stop Date  Quantity   Comments   Source

 

           ASSERTION  2021            Pregnant              [facility] of



                      00:00:00                                    [facility]

 

           Exposure to                       Not sure              [facility]



           SARS-CoV-2                                             [facility]



           (event)                                                [place]

 

           Alcohol intake 2022-03-15 2022-03-15 Lifetime              [facility]

 of



                      00:00:00   00:00:00   non-drinker            [facility]



                                            (finding)             [place]

 

           Tobacco use and 2022 Never used            [facility] of



           exposure   00:00:00   00:00:00                         [facility]

 

           History of            2022 Smoker                [facility] of



           tobacco use            00:00:00                         [facility]

 

           Sex Assigned At 2002                       [facility] of



           Birth      00:00:00   00:00:00                         [facility]









                Smoking Status  Start Date      Stop Date       Source

 

                Former smoker   2022 00:00:00 2022 00:00:00 [facility] of [facility]







[facility]

 

             Diastolic blood 2022-03-15 18:44:00 74 mm[Hg]                 [facility]

[facility]

 

             Heart rate   2022-03-15 18:44:00 82 /min                   [facility]

 

             Body temperature 2022-03-15 18:44:00 36.44 Nadine                 [facility]

 

             Respiratory rate 2022-03-15 18:44:00 16 /min                   [facility]

 

             Body height  2022-03-15 18:44:00 170.2 cm                  [facility]

 

             Body weight  2022-03-15 18:44:00 87.816 kg                 [facility]

 

             BMI          2022-03-15 18:44:00 30.32 kg/m2               [facility]







Procedures







                Procedure       Date / Time Performed Performing Clinician Sourc

e

 

                POCT URINALYSIS 2022-03-15 18:45:00 Randy Colón [facility]







Encounters







        Start   End     Encounter Admission Attending Care    Care    Encounter 

Source



        Date/Time Date/Time Type    Type    Clinicians Facility Department ID   

   

 

        2022 Outpatient P               [facility]    [id number]

-20 Univers



        08:00:00 08:00:00                                         [id number]  ity [facility]

 

        2022-03-15 2022-03-15 Routine         MASON Colón    [id number]

[id number] Univers



        13:30:00 14:08:12 Prenatal         [facility]  [id number]       

  ity of



                        Visit                   REGIONAL [id number]         Josemanuel

as



                                                MATERNAL [id number]         Med

ical



                                                & CHILD [address] [place] [address]                 







     



[facility] METABOLITE, QUANT, [id number] 
13:53:13





             Test Item    Value        Reference Range Interpretation Comments

 

             CARBOXY-THC  Positive                  A            



             INTERP (test                                        



             code = 87418)                                        

 

             CARBOXY-THC  >500 ng/mL   <15          H             Reference rang

e indicates



             QNT (test                                           cutoff for posi

tive result



             code = 00519)                                        determination.

 Specimen Type:



                                                                 Urine Urine [place]

g and metabolite



                                                                 concentrations 

are dependent on



                                                                 manyfactors, in

cluding patient



                                                                 compliance, [place]

bill

[facility], [facility].



                                                                 [address] [facility],



                                                                 [address] [address]



                                                                                

 CLIA NO:



                                                                 [id number]



                                                                    UNLESS OTHER

WISE INDICATED,



                                                                 ALL TESTING PER

FORMED



                                                                 ATCLINICAL PATH

[facility]

[place].  [address] [place] 

[address]



                                                                 LABORATORY DIRE

CTOR:  JUSTIN DUDLEY M.D.

      CLIA



                                                                 NUMBER [id number]

03  CAP



                                                                 ACCREDITATION N

O. [id number]



CULTURE, [id number] 09:46:33SPECIMEN NUMBER: [id number] CULTURE, URINE      
                                   SPECIMEN NUMBER: [id number] SHARATH

OUNCEMENT AT



             08548)                                              ***



                                                                 [email or web address]



                                                                 [email or web address]

 SHARATH

OUNCEMENT AT



             97750)                                              ***



                                                                 [email or web address]



                                                                 [employer]rine[employer]

 69327)                                                

            VZV



                                                                 IgG           N

EGATIVE .



                                                                 . . . . . . . .

 . . .



                                                                 INDEX          

<135



                                                                      EQUIVOCAL.

 . . . .



                                                                 . . . . . . . I

NDEX



                                                                  135-164       

    NOTE:



                                                                 CONSIDER RETEST

ING IN A



                                                                 CLINICALLY SUIT

ABLE



                                                                            NATI

             = 53863)                                            

 

             PHENCYCLIDINE (test NEGATIVE     NEGATIVE                  



             code = 3210)                                        

 

             METHADONE (test code NEGATIVE     NEGATIVE                  



             = 3207)                                             

 

             BUPRENORPHINE (test NEGATIVE     NEGATIVE                  



             code = 97621)                                        

 

             SOURCE (test code = URINE                                        **

* SEE BELOW FOR



             967150)                                             

wanda



                                                                 discrepancies, 

consider



                                                                 directed testin

g for



                                                                 specificcompoun

ds or



                                                                 contact the lab

oratory



                                                                 within specimen



                                                                 stability tofor

alegria 

angel



                                                                 34109)                                        

 

             BLOOD TYPE AND RH O POSITIVE                              A HISTORI

WANDA 51133)                                        

 

             HEPATITIS B SURF AG NON-REACTIVE NON-REACTIVE              



             (test code = 2739)                                        

 

             RPR (test code = NON-REACTIVE NON-REACTIVE              



             79761)                                              

 

             77524)                                        

 

             = 4202)                                             evidence of acu

te



                                                                 hepatitis A.

 

             INTERPRETATION (NOTE)                                      Hepatiti

s B



             HEPATITIS B: (test code                                        sero

logy shows no



             = 65985)                                            evidence of acu

te



                                                                 hepatitis B and

no



                                                                 indication of



                                                                 exposure to



                                                                 hepatitis B vir

us



                                                                 in the previous



                                                                 karlos eight



                                                                 months.

 

             INTERPRETATION (NOTE)                                      Hepatiti

s C



             HEPATITIS C: (test code                                        sero

logy shows no



             = 82444)                                            



[id number] 02:50:09





             Test Item    Value        Reference Range Interpretation Comments

 

             RPR RESULT (test code = NON-REACTIVE NON-REACTIVE              



             3501)                                               

 

             RPR TITER (test code = 3500) NOT INDIC. TITER NOT INDIC.
fall

## 2023-09-06 NOTE — PROGRESS NOTE ADULT - PROBLEM SELECTOR PLAN 7
Hgb decreased to 11.3  -No reports of bleeding as per patient   -F/u with iron studies   -Trend Hgb daily
Hgb decreased to 11.3  -No reports of bleeding as per patient   -F/u with iron studies   -Trend Hgb daily

## 2023-09-06 NOTE — DISCHARGE NOTE NURSING/CASE MANAGEMENT/SOCIAL WORK - NSDCPEFALRISK_GEN_ALL_CORE
For information on Fall & Injury Prevention, visit: [email or web address] OR  [email or web address] OR  https://www.cdc.gov/steadi/patient.html

## 2023-09-06 NOTE — PHYSICAL THERAPY INITIAL EVALUATION ADULT - ADDITIONAL COMMENTS
Patient lives in apartment alone with 12 steps to negotiate. Patient was independent prior to admission. Patient was not using an assistive device prior to admission.

## 2023-09-06 NOTE — PHYSICAL THERAPY INITIAL EVALUATION ADULT - NSPTDISCHREC_GEN_A_CORE
Patient wants to verify that her medications were reviewed with Dr Benoit prior to her visit with YULIYA Rosa on 8/19.    Pending gait and stair assessment, anticipate home no needs. Please continue to follow./No skilled PT needs

## 2023-09-06 NOTE — PROGRESS NOTE ADULT - PROBLEM SELECTOR PLAN 4
K decreased to 3.1   -S/p PO repleition   -F/u K in the AM   -C/w telemonitoring
K decreased to 3.1   -S/p PO repleition   -F/u K in the AM   -C/w telemonitoring
Was The Patient On Physician Recommended Anticoagulation Therapy?: Please Select the Appropriate Response

## 2023-09-06 NOTE — PROGRESS NOTE ADULT - SUBJECTIVE AND OBJECTIVE BOX
Date of service 9/6/23    chief complaint: near syncope    extended hpi: 83 year old woman with  HTN, T2DM, HLD, on the PAD dose of Xarelto presents to the ED with syncope    no events overnight, no pain or SOB    Review of Systems:   Constitutional: [ ] fevers, [ ] chills.   Skin: [ ] dry skin. [ ] rashes.  Psychiatric: [ ] depression, [ ] anxiety.   Gastrointestinal: [ ] BRBPR, [ ] melena.   Neurological: [ ] confusion. [ ] seizures. [ ] shuffling gait.   Ears,Nose,Mouth and Throat: [ ] ear pain [ ] sore throat.   Eyes: [ ] diplopia.   Respiratory: [ ] hemoptysis. [ ] shortness of breath  Cardiovascular: See HPI above  Hematologic/Lymphatic: [ ] anemia. [ ] painful nodes. [ ] prolonged bleeding.   Genitourinary: [ ] hematuria. [ ] flank pain.   Endocrine: [ ] significant change in weight. [ ] intolerance to heat and cold.     Review of systems [ x] otherwise negative, [ ] otherwise unable to obtain    FH: no family history of sudden cardiac death in first degree relatives    SH: [ ] tobacco, [ ] alcohol, [ ] drugs    acetaminophen     Tablet .. 650 milliGRAM(s) Oral every 6 hours PRN  aluminum hydroxide/magnesium hydroxide/simethicone Suspension 30 milliLiter(s) Oral every 4 hours PRN  cefTRIAXone   IVPB 1000 milliGRAM(s) IV Intermittent every 24 hours  dorzolamide 2% Ophthalmic Solution 1 Drop(s) Both EYES <User Schedule>  glucagon  Injectable 1 milliGRAM(s) IntraMuscular once  insulin lispro (ADMELOG) corrective regimen sliding scale   SubCutaneous three times a day before meals  latanoprost 0.005% Ophthalmic Solution 1 Drop(s) Right EYE at bedtime  melatonin 3 milliGRAM(s) Oral at bedtime PRN  ondansetron Injectable 4 milliGRAM(s) IV Push every 8 hours PRN  rivaroxaban 2.5 milliGRAM(s) Oral two times a day                            11.7   8.74  )-----------( 309      ( 05 Sep 2023 06:25 )             35.1     134<L>  |  100  |  18  ----------------------------<  80  3.6   |  24  |  1.18    Ca    9.3      06 Sep 2023 06:20  Phos  2.2     09-06  Mg     2.20     09-06    TPro  7.2  /  Alb  3.8  /  TBili  0.5  /  DBili  x   /  AST  79<H>  /  ALT  144<H>  /  AlkPhos  76  09-06      CARDIAC MARKERS ( 04 Sep 2023 16:44 )  x     / x     / 267 U/L / x     / x            T(C): 36.4 (09-06-23 @ 12:05), Max: 36.6 (09-06-23 @ 06:33)  HR: 70 (09-06-23 @ 12:05) (60 - 70)  BP: 139/81 (09-06-23 @ 12:05) (104/64 - 139/81)  RR: 17 (09-06-23 @ 12:05) (16 - 17)  SpO2: 100% (09-06-23 @ 12:05) (96% - 100%)  Wt(kg): --    General: Well nourished in no acute distress.   Head: Normocephalic and atraumatic.   Neck: No JVD. No bruits. Supple. Does not appear to be enlarged.   Cardiovascular: + S1,S2 ; RRR Soft systolic murmur at the left lower sternal border. No rubs noted.    Lungs: CTA b/l. No rhonchi, rales or wheezes.   Abdomen: + BS, soft. Non tender. Non distended. No rebound. No guarding.   Extremities: No clubbing/cyanosis/edema.   Neurologic: Moves all four extremities. Full range of motion.   Skin: Warm and moist. The patient's skin has normal elasticity and good skin turgor.   Psychiatric: unable to fully assess  Musculoskeletal: Normal range of motion, normal strength    DATA    TELEMETRY: NSR, sinus chapin 50.  No long pauses. triplet  	    ECG: Sinus chapin 50.      < from: Transthoracic Echocardiogram (09.05.23 @ 13:28) >  DIMENSIONS:  Dimensions:     Normal Values:  LA:     2.9 cm    2.0 - 4.0 cm  Ao:     2.5 cm    2.0 - 3.8 cm  SEPTUM: 0.7 cm    0.6 - 1.2 cm  PWT:    0.7 cm    0.6 - 1.1 cm  LVIDd:  4.4 cm    3.0 - 5.6 cm  LVIDs:  2.7 cm    1.8 - 4.0 cm  Derived Variables:  LVMI: 46 g/m2  RWT: 0.31  Fractional short: 39 %  Ejection Fraction (Tabor Rule): 65 %  ------------------------------------------------------------------------  OBSERVATIONS:  Mitral Valve: Mitral annular calcification, otherwise  normal mitral valve. Minimal mitral regurgitation.  Aortic Root: Normal aortic root.  Aortic Valve: Aortic valve leaflet morphology not well  visualized.  Left Atrium: Normal left atrium.  Left Ventricle: Normal left ventricular systolic function.  No segmental wall motion abnormalities. Normal left  ventricular internal dimensions and wall thicknesses.  Right Heart: Normal right atrium. The right ventricle is  not well visualized; grossly normal right ventricular  systolic function. Normal tricuspid valve. Minimal  tricuspid regurgitation. Normal pulmonic valve.  Pericardium/PleuraNormal pericardium with no pericardial  effusion.    < end of copied text >      ASSESSMENT/PLAN: Ms Hsieh is a very pleasant 83y Female here w/ a presyncopal episode.  Syncope exists on a spectrum, and she apparently was close enough to fainting to have post-event fatigue.  I don't think this was a seizure based on her onset and offset symptoms, and witnessed report from her nephew to receiving team at [facility]  Shes had similar episodes in the past.  Her telemetry/EKG and Echocardiogram are reassuringly normal.  Her blood pressure is stable on no medications at this time.  Last year, was on a few medications.  Unclear why she is on the PAD/CAD stabilizing dose of Xarelto, and not on an Aspirin?  This is NOT the AFib dose of Xarelto (which would be 15-20mg qPM), and the patient states no known history of arrhythmia  Orthostatic vitals negative.  Recommend ambulatory Holter/MCOT with her Cardiology team (Dr Rodriguez's office).  Will follow with you. At this time, not enough evidence to recommend permanent cardiac pacing.    NO further inpatient cardiac work up planned    Erma RANGEL  [phone]    Date of service 9/6/23    chief complaint: near syncope    extended hpi: 83 year old woman with  HTN, T2DM, HLD, on the PAD dose of Xarelto presents to the ED with syncope    no events overnight, no pain or SOB    Review of Systems:   Constitutional: [ ] fevers, [ ] chills.   Skin: [ ] dry skin. [ ] rashes.  Psychiatric: [ ] depression, [ ] anxiety.   Gastrointestinal: [ ] BRBPR, [ ] melena.   Neurological: [ ] confusion. [ ] seizures. [ ] shuffling gait.   Ears,Nose,Mouth and Throat: [ ] ear pain [ ] sore throat.   Eyes: [ ] diplopia.   Respiratory: [ ] hemoptysis. [ ] shortness of breath  Cardiovascular: See HPI above  Hematologic/Lymphatic: [ ] anemia. [ ] painful nodes. [ ] prolonged bleeding.   Genitourinary: [ ] hematuria. [ ] flank pain.   Endocrine: [ ] significant change in weight. [ ] intolerance to heat and cold.     Review of systems [ x] otherwise negative, [ ] otherwise unable to obtain    FH: no family history of sudden cardiac death in first degree relatives    SH: [ ] tobacco, [ ] alcohol, [ ] drugs    acetaminophen     Tablet .. 650 milliGRAM(s) Oral every 6 hours PRN  aluminum hydroxide/magnesium hydroxide/simethicone Suspension 30 milliLiter(s) Oral every 4 hours PRN  cefTRIAXone   IVPB 1000 milliGRAM(s) IV Intermittent every 24 hours  dorzolamide 2% Ophthalmic Solution 1 Drop(s) Both EYES <User Schedule>  glucagon  Injectable 1 milliGRAM(s) IntraMuscular once  insulin lispro (ADMELOG) corrective regimen sliding scale   SubCutaneous three times a day before meals  latanoprost 0.005% Ophthalmic Solution 1 Drop(s) Right EYE at bedtime  melatonin 3 milliGRAM(s) Oral at bedtime PRN  ondansetron Injectable 4 milliGRAM(s) IV Push every 8 hours PRN  rivaroxaban 2.5 milliGRAM(s) Oral two times a day                            11.7   8.74  )-----------( 309      ( 05 Sep 2023 06:25 )             35.1     134<L>  |  100  |  18  ----------------------------<  80  3.6   |  24  |  1.18    Ca    9.3      06 Sep 2023 06:20  Phos  2.2     09-06  Mg     2.20     09-06    TPro  7.2  /  Alb  3.8  /  TBili  0.5  /  DBili  x   /  AST  79<H>  /  ALT  144<H>  /  AlkPhos  76  09-06      CARDIAC MARKERS ( 04 Sep 2023 16:44 )  x     / x     / 267 U/L / x     / x            T(C): 36.4 (09-06-23 @ 12:05), Max: 36.6 (09-06-23 @ 06:33)  HR: 70 (09-06-23 @ 12:05) (60 - 70)  BP: 139/81 (09-06-23 @ 12:05) (104/64 - 139/81)  RR: 17 (09-06-23 @ 12:05) (16 - 17)  SpO2: 100% (09-06-23 @ 12:05) (96% - 100%)  Wt(kg): --    General: Well nourished in no acute distress.   Head: Normocephalic and atraumatic.   Neck: No JVD. No bruits. Supple. Does not appear to be enlarged.   Cardiovascular: + S1,S2 ; RRR Soft systolic murmur at the left lower sternal border. No rubs noted.    Lungs: CTA b/l. No rhonchi, rales or wheezes.   Abdomen: + BS, soft. Non tender. Non distended. No rebound. No guarding.   Extremities: No clubbing/cyanosis/edema.   Neurologic: Moves all four extremities. Full range of motion.   Skin: Warm and moist. The patient's skin has normal elasticity and good skin turgor.   Psychiatric: unable to fully assess  Musculoskeletal: Normal range of motion, normal strength    DATA    TELEMETRY: NSR, sinus chapin 50.  No long pauses. triplet  	    ECG: Sinus chapin 50.      < from: Transthoracic Echocardiogram (09.05.23 @ 13:28) >  DIMENSIONS:  Dimensions:     Normal Values:  LA:     2.9 cm    2.0 - 4.0 cm  Ao:     2.5 cm    2.0 - 3.8 cm  SEPTUM: 0.7 cm    0.6 - 1.2 cm  PWT:    0.7 cm    0.6 - 1.1 cm  LVIDd:  4.4 cm    3.0 - 5.6 cm  LVIDs:  2.7 cm    1.8 - 4.0 cm  Derived Variables:  LVMI: 46 g/m2  RWT: 0.31  Fractional short: 39 %  Ejection Fraction (Tabor Rule): 65 %  ------------------------------------------------------------------------  OBSERVATIONS:  Mitral Valve: Mitral annular calcification, otherwise  normal mitral valve. Minimal mitral regurgitation.  Aortic Root: Normal aortic root.  Aortic Valve: Aortic valve leaflet morphology not well  visualized.  Left Atrium: Normal left atrium.  Left Ventricle: Normal left ventricular systolic function.  No segmental wall motion abnormalities. Normal left  ventricular internal dimensions and wall thicknesses.  Right Heart: Normal right atrium. The right ventricle is  not well visualized; grossly normal right ventricular  systolic function. Normal tricuspid valve. Minimal  tricuspid regurgitation. Normal pulmonic valve.  Pericardium/PleuraNormal pericardium with no pericardial  effusion.    < end of copied text >      ASSESSMENT/PLAN: Ms Hsieh is a very pleasant 83y Female here w/ a presyncopal episode.  Syncope exists on a spectrum, and she apparently was close enough to fainting to have post-event fatigue.  I don't think this was a seizure based on her onset and offset symptoms, and witnessed report from her nephew to receiving team at [facility].  Shes had similar episodes in the past.  Her telemetry/EKG and Echocardiogram are reassuringly normal.  Her blood pressure is stable on no medications at this time.  Last year, was on a few medications.  Unclear why she is on the PAD/CAD stabilizing dose of Xarelto, and not on an Aspirin?  This is NOT the AFib dose of Xarelto (which would be 15-20mg qPM), and the patient states no known history of arrhythmia  Orthostatic vitals negative.  Recommend ambulatory Holter/MCOT with her Cardiology team (Dr Rodriguez's office).  Will follow with you. At this time, not enough evidence to recommend permanent cardiac pacing.    No further inpatient cardiac work up planned    Erma RANGEL  [phone]

## 2023-09-06 NOTE — DISCHARGE NOTE PROVIDER - NSDCMRMEDTOKEN_GEN_ALL_CORE_FT
amLODIPine 5 mg oral tablet: 1 tab(s) orally once a day  brimonidine-timolol 0.2%-0.5% ophthalmic solution: 1 each in each affected eye 2 times a day  dorzolamide 2% ophthalmic solution: 1 each in each eye 2 times a day  ezetimibe 10 mg oral tablet: 1 tab(s) orally once a day  Jardiance: 25 milligram(s) orally once a day  latanoprost 0.005% ophthalmic solution: 1 each in each eye once a day (at bedtime)  rosuvastatin 40 mg oral capsule: 1 tab(s) orally once a day (at bedtime)  Trulicity Pen 4.5 mg/0.5 mL subcutaneous solution: 4.5 milligram(s) subcutaneously once a week  valsartan 160 mg oral capsule: 1 tab(s) orally once a day (at bedtime)  Xarelto 2.5 mg oral tablet: 1 tab(s) orally 2 times a day   brimonidine-timolol 0.2%-0.5% ophthalmic solution: 1 each in each affected eye 2 times a day  dorzolamide 2% ophthalmic solution: 1 each in each eye 2 times a day  ezetimibe 10 mg oral tablet: 1 tab(s) orally once a day  Jardiance: 25 milligram(s) orally once a day  latanoprost 0.005% ophthalmic solution: 1 each in each eye once a day (at bedtime)  rosuvastatin 40 mg oral capsule: 1 tab(s) orally once a day (at bedtime)  Trulicity Pen 4.5 mg/0.5 mL subcutaneous solution: 4.5 milligram(s) subcutaneously once a week  Xarelto 2.5 mg oral tablet: 1 tab(s) orally 2 times a day

## 2023-09-06 NOTE — DISCHARGE NOTE NURSING/CASE MANAGEMENT/SOCIAL WORK - PATIENT PORTAL LINK FT
You can access the FollowMyHealth Patient Portal offered by [facility] by registering at the following website: [email or web address]. By joining [email or web address]’s FollowMyHealth portal, you will also be able to view your health information using other applications (apps) compatible with our system.

## 2023-09-06 NOTE — PROGRESS NOTE ADULT - SUBJECTIVE AND OBJECTIVE BOX
Patient is a 83y old  Female who presents with a chief complaint of     SUBJECTIVE / OVERNIGHT EVENTS:       T(C): 36.4 (09-06-23 @ 12:05), Max: 36.6 (09-06-23 @ 06:33)  HR: 70 (09-06-23 @ 12:05) (60 - 70)  BP: 139/81 (09-06-23 @ 12:05) (138/72 - 139/81)  RR: 17 (09-06-23 @ 12:05) (16 - 17)  SpO2: 100% (09-06-23 @ 12:05) (99% - 100%)    MEDICATIONS  (STANDING):  cefTRIAXone   IVPB 1000 milliGRAM(s) IV Intermittent every 24 hours  dextrose 5%. 1000 milliLiter(s) (50 mL/Hr) IV Continuous <Continuous>  dextrose 5%. 1000 milliLiter(s) (100 mL/Hr) IV Continuous <Continuous>  dextrose 50% Injectable 25 Gram(s) IV Push once  dextrose 50% Injectable 12.5 Gram(s) IV Push once  dextrose 50% Injectable 25 Gram(s) IV Push once  dorzolamide 2% Ophthalmic Solution 1 Drop(s) Both EYES <User Schedule>  glucagon  Injectable 1 milliGRAM(s) IntraMuscular once  insulin lispro (ADMELOG) corrective regimen sliding scale   SubCutaneous three times a day before meals  latanoprost 0.005% Ophthalmic Solution 1 Drop(s) Right EYE at bedtime  rivaroxaban 2.5 milliGRAM(s) Oral two times a day    MEDICATIONS  (PRN):  acetaminophen     Tablet .. 650 milliGRAM(s) Oral every 6 hours PRN Temp greater or equal to 38C (100.4F), Mild Pain (1 - 3)  aluminum hydroxide/magnesium hydroxide/simethicone Suspension 30 milliLiter(s) Oral every 4 hours PRN Dyspepsia  dextrose Oral Gel 15 Gram(s) Oral once PRN Blood Glucose LESS THAN 70 milliGRAM(s)/deciliter  melatonin 3 milliGRAM(s) Oral at bedtime PRN Insomnia  ondansetron Injectable 4 milliGRAM(s) IV Push every 8 hours PRN Nausea and/or Vomiting      PHYSICAL EXAM:  GENERAL: NAD, well-developed  HEAD:  Atraumatic, Normocephalic  EYES: EOMI, PERRLA, conjunctiva and sclera clear  NECK: Supple, No JVD  CHEST/LUNG: Clear to auscultation bilaterally; No wheeze  HEART: Regular rate and rhythm; No murmurs, rubs, or gallops  ABDOMEN: Soft, Nontender, Nondistended; Bowel sounds present  EXTREMITIES:  2+ Peripheral Pulses, No clubbing, cyanosis, or edema  PSYCH: AAOx3  NEUROLOGY: non-focal  SKIN: No rashes or lesions                          11.7   8.74  )-----------( 309      ( 05 Sep 2023 06:25 )             35.1       CARDIAC MARKERS ( 04 Sep 2023 16:44 )  x     / x     / 267 U/L / x     / x          LIVER FUNCTIONS - ( 06 Sep 2023 06:20 )  Alb: 3.8 g/dL / Pro: 7.2 g/dL / ALK PHOS: 76 U/L / ALT: 144 U/L / AST: 79 U/L / GGT: x             134|100|18<80  3.6|24|1.18  9.3,2.20,2.2  09-06 @ 06:20        RADIOLOGY & ADDITIONAL TESTS:    Imaging Personally Reviewed:    Consultant(s) Notes Reviewed:      Care Discussed with Consultants/Other Providers:

## 2023-09-06 NOTE — DISCHARGE NOTE NURSING/CASE MANAGEMENT/SOCIAL WORK - NSDCPEXARELTOREACT_GEN_ALL_CORE
HISTORY OF PRESENT ILLNESS  Jacqueline Nolen is a 60 y.o. female. HPI   This aldy returns to the clinic for back pain. She injured herself in the OR 07/18/17. She was previously evaluated by me a few days ago. She states she is not much better. She continues to have right sided low back pain, radiating down her leg. No problems with urination or bowel dysfunction but ambulation has been difficult. She has self referred to a neurosurgeon who ordered an MRI on her low back    Review of Systems   Constitutional: Negative for chills and fever. Respiratory: Negative for shortness of breath. Cardiovascular: Negative for chest pain. Genitourinary: Negative for dysuria and urgency. Musculoskeletal: Positive for back pain. Neurological: Negative for focal weakness and loss of consciousness. Physical Exam   Constitutional: She is oriented to person, place, and time. She appears well-developed and well-nourished. No distress. Musculoskeletal: She exhibits tenderness. She exhibits no edema or deformity. Paraspinal muscle ttp lumbo sacral region,unable to perform slt   Neurological: She is alert and oriented to person, place, and time. She has normal reflexes. She displays normal reflexes. No cranial nerve deficit. She exhibits abnormal muscle tone. Coordination normal.   Skin: She is not diaphoretic. ASSESSMENT and PLAN    ICD-10-CM ICD-9-CM    1.  Acute low back pain, unspecified back pain laterality, with sciatica presence unspecified M54.5 724.2    will refer to ortho  Precautions given Rivaroxaban/Xarelto increases your risk for bleeding. Notify your doctor if you experience any of the following side effects: unusual bleeding or bruising, vomiting blood or coffee ground-like material, red or black stool, itching or hives, chest tightness, trouble breathing, swelling in your face or hands, swelling in your mouth or throat, change in how much or how often you urinate, red or brown urine, heavy menstrual or vaginal bleeding, or blistering or peeling skin. When Rivaroxaban/Xarelto is taken with other medicines, they can affect how it works. Taking other medications such as aspirin, antibiotics, antifungals, blood thinners, nonsteroidal anti-inflammatories, and medications that treat depression can increase your risk of bleeding. It is very important to tell your health care provider about all of the other medicines, including over-the-counter medications, herbs, and vitamins you are taking.  DO NOT start, stop, or change the dosage of any medicine, including over-the-counter medicines, vitamins, and herbal products without your doctor’s approval.  Any products containing aspirin or are nonsteroidal anti-inflammatories lessen the blood’s ability to form clots and adds to the effect of Rivaroxaban/Xarelto. Never take aspirin or medicines that contain aspirin without speaking to your doctor.

## 2023-09-06 NOTE — PROGRESS NOTE ADULT - PROBLEM SELECTOR PLAN 2
UA positive for LE, WBCs, and bacteria   -Pt denies any urinary symptoms   -Due to advanced age, will treat with IV ceftriaxone   -F/u with urine cx
UA positive for LE, WBCs, and bacteria   -Pt denies any urinary symptoms   -Due to advanced age, will treat with IV ceftriaxone   -F/u with urine cx

## 2023-09-06 NOTE — PROGRESS NOTE ADULT - PROBLEM SELECTOR PLAN 6
ASt/ALT elevated   -no abdominal pain/tenderness on exam   -F/u with hepatitis panel   -F/u with US abdomen  -Hold atorvastatin
ASt/ALT elevated   -no abdominal pain/tenderness on exam   -F/u with hepatitis panel   -F/u with US abdomen  -Hold atorvastatin

## 2023-09-06 NOTE — PROGRESS NOTE ADULT - PROBLEM SELECTOR PLAN 5
Patient with junctional rhythm then to sinus rhythm vs. afib   -Denies any chest pain or palpitations. But had a syncopal episode   -EP needs to be consulted in the AM given EKG changes and syncope
Patient with junctional rhythm then to sinus rhythm vs. afib   -Denies any chest pain or palpitations. But had a syncopal episode   -EP needs to be consulted in the AM given EKG changes and syncope

## 2023-09-06 NOTE — PROGRESS NOTE ADULT - PROBLEM SELECTOR PLAN 10
DVT ppx SCD  On xarelto 2.5 mg BID at home for prevention. Hold due to KATHY    Medication list obtained from sister Mrs May.
DVT ppx SCD  On xarelto 2.5 mg BID at home for prevention. Hold due to KATHY    Medication list obtained from sister Mrs May.

## 2023-09-06 NOTE — DISCHARGE NOTE PROVIDER - HOSPITAL COURSE
83 year old woman with  HTN, T2DM, HLD, afib on Xarelto presents to the ED with syncope, she was admitted to telemetry service for further evaluation.  CTH did not show infarct or hemorraghe and orthostatic vital signs were negative.  She had an echocardiogram w/EF of 65%. Her UA was indicative of an acute UTi and she was treated w/a 3 day course of ceftriaxone.  She was seen by EP who recommended outpatient          Problem/Plan - 3:  ·  Problem: KATHY (acute kidney injury).   ·  Plan: Scr elevated to 1.6; baseline is around 1.1   -Most likely dehydration vs. UTI   -F/u with UA, urine lytes and US abdomen   -Hold valsartan   -Trend Scr daily.           Problem/Plan - 6:  ·  Problem: Transaminitis.   ·  Plan: ASt/ALT elevated   -no abdominal pain/tenderness on exam   -F/u with hepatitis panel   -F/u with US abdomen  -Hold atorvastatin.     Problem/Plan - 7:  ·  Problem: Anemia.   ·  Plan: Hgb decreased to 11.3  -No reports of bleeding as per patient   -F/u with iron studies   -Trend Hgb daily.     Problem/Plan - 8:  ·  Problem: Type 2 diabetes mellitus.   ·  Plan: C/w ISS and FS   -F/u with HgbA1c.     Problem/Plan - 9:  ·  Problem: Essential hypertension.   ·  Plan: Hold amlodipine and valsartan for now due to hypotension.   83 year old woman with  HTN, T2DM, HLD, afib on Xarelto presents to the ED with syncope, she was admitted to telemetry service for further evaluation.  CTH did not show infarct or hemorraghe and orthostatic vital signs were negative.  She had an echocardiogram w/EF of 65%. Her UA was indicative of an acute UTi and she was treated w/a 3 day course of ceftriaxone.  She was seen by EP who recommended outpatient holter monitoring.  Her valsartan was held for KATHY, she is to follow up as an outpatient for continuation.  Labs notable for transaminitis, hepatitis panel and abdominal US were both negative.  Antihypertensives were held due to hypotension.  patient was seen by physical therapy and deemed to have no PT needs                   83 year old woman with  HTN, T2DM, HLD, afib on Xarelto presents to the ED with syncope, she was admitted to telemetry service for further evaluation.  CTH did not show infarct or hemorraghe and orthostatic vital signs were negative.  She had an echocardiogram w/EF of 65%. Her UA was indicative of an acute UTi and she was treated w/a 3 day course of ceftriaxone.  She was seen by EP who recommended outpatient holter monitoring.  Her valsartan was held for KATHY, she is to follow up as an outpatient for continuation.  Labs notable for transaminitis, hepatitis panel and abdominal US were both negative.  Antihypertensives were held due to hypotension.  patient was seen by physical therapy and deemed to have no PT needs      On 9/6/2023 patient medically cleared for discharge home by Dr. Porter

## 2023-09-06 NOTE — PROGRESS NOTE ADULT - PROBLEM SELECTOR PLAN 1
Patient with unresponsive, found to have low SBP at home   -Most likely due to orthostatic hypotension   -DDx also includes hypoglycemia, hypokalemia,  dehydration, vasovagal, arrhythmia, structural heart disease, or TIA   -Unclear what her FS was during initial presentation. Will trend FS   -S/p IVF in the ED, with improvement with BP   -EKG showed junctional rhythm-> repeat sinus rhythm with 1st degree block. Management as below   -F/u with TTE   -F/u with D-dimer   -F/u with CT head   -C/w telemonitoring
TTE reviewed   Orthstatics negative     C/w telemonitoring

## 2023-10-12 ENCOUNTER — APPOINTMENT (OUTPATIENT)
Dept: OPHTHALMOLOGY | Facility: CLINIC | Age: 83
End: 2023-10-12
Payer: MEDICARE

## 2023-10-12 ENCOUNTER — NON-APPOINTMENT (OUTPATIENT)
Age: 83
End: 2023-10-12

## 2023-10-12 PROCEDURE — 92012 INTRM OPH EXAM EST PATIENT: CPT

## 2023-10-12 PROCEDURE — 92083 EXTENDED VISUAL FIELD XM: CPT

## 2023-11-07 ENCOUNTER — OUTPATIENT (OUTPATIENT)
Dept: OUTPATIENT SERVICES | Facility: HOSPITAL | Age: 83
LOS: 1 days | End: 2023-11-07
Payer: MEDICARE

## 2023-11-07 ENCOUNTER — APPOINTMENT (OUTPATIENT)
Dept: MAMMOGRAPHY | Facility: IMAGING CENTER | Age: 83
End: 2023-11-07
Payer: MEDICARE

## 2023-11-07 ENCOUNTER — APPOINTMENT (OUTPATIENT)
Dept: ULTRASOUND IMAGING | Facility: IMAGING CENTER | Age: 83
End: 2023-11-07
Payer: MEDICARE

## 2023-11-07 DIAGNOSIS — Z00.8 ENCOUNTER FOR OTHER GENERAL EXAMINATION: ICD-10-CM

## 2023-11-07 DIAGNOSIS — Z98.49 CATARACT EXTRACTION STATUS, UNSPECIFIED EYE: Chronic | ICD-10-CM

## 2023-11-07 PROCEDURE — 77063 BREAST TOMOSYNTHESIS BI: CPT | Mod: 26

## 2023-11-07 PROCEDURE — 76641 ULTRASOUND BREAST COMPLETE: CPT | Mod: 26,50,GY

## 2023-11-07 PROCEDURE — 77067 SCR MAMMO BI INCL CAD: CPT

## 2023-11-07 PROCEDURE — 77063 BREAST TOMOSYNTHESIS BI: CPT

## 2023-11-07 PROCEDURE — 77067 SCR MAMMO BI INCL CAD: CPT | Mod: 26

## 2023-11-07 PROCEDURE — 76641 ULTRASOUND BREAST COMPLETE: CPT

## 2024-03-28 ENCOUNTER — APPOINTMENT (OUTPATIENT)
Dept: OPHTHALMOLOGY | Facility: CLINIC | Age: 84
End: 2024-03-28
Payer: MEDICARE

## 2024-03-28 ENCOUNTER — NON-APPOINTMENT (OUTPATIENT)
Age: 84
End: 2024-03-28

## 2024-03-28 PROCEDURE — 92083 EXTENDED VISUAL FIELD XM: CPT

## 2024-03-28 PROCEDURE — 92012 INTRM OPH EXAM EST PATIENT: CPT

## 2024-04-12 NOTE — PROGRESS NOTE ADULT - PROBLEM SELECTOR PLAN 9
Hold amlodipine and valsartan for now due to hypotension
Hold amlodipine and valsartan for now due to hypotension
English

## 2024-07-25 ENCOUNTER — APPOINTMENT (OUTPATIENT)
Dept: OPHTHALMOLOGY | Facility: CLINIC | Age: 84
End: 2024-07-25
Payer: MEDICARE

## 2024-07-25 ENCOUNTER — NON-APPOINTMENT (OUTPATIENT)
Age: 84
End: 2024-07-25

## 2024-07-25 PROCEDURE — 92250 FUNDUS PHOTOGRAPHY W/I&R: CPT

## 2024-07-25 PROCEDURE — 92014 COMPRE OPH EXAM EST PT 1/>: CPT

## 2024-11-11 ENCOUNTER — APPOINTMENT (OUTPATIENT)
Dept: MAMMOGRAPHY | Facility: IMAGING CENTER | Age: 84
End: 2024-11-11
Payer: MEDICARE

## 2024-11-11 ENCOUNTER — APPOINTMENT (OUTPATIENT)
Dept: ULTRASOUND IMAGING | Facility: IMAGING CENTER | Age: 84
End: 2024-11-11
Payer: MEDICARE

## 2024-11-11 ENCOUNTER — OUTPATIENT (OUTPATIENT)
Dept: OUTPATIENT SERVICES | Facility: HOSPITAL | Age: 84
LOS: 1 days | End: 2024-11-11
Payer: MEDICARE

## 2024-11-11 DIAGNOSIS — Z53.20 PROCEDURE AND TREATMENT NOT CARRIED OUT BECAUSE OF PATIENT'S DECISION FOR UNSPECIFIED REASONS: ICD-10-CM

## 2024-11-11 DIAGNOSIS — Z98.49 CATARACT EXTRACTION STATUS, UNSPECIFIED EYE: Chronic | ICD-10-CM

## 2024-11-11 PROCEDURE — 77067 SCR MAMMO BI INCL CAD: CPT | Mod: 26

## 2024-11-11 PROCEDURE — 77063 BREAST TOMOSYNTHESIS BI: CPT | Mod: 26

## 2024-11-19 ENCOUNTER — APPOINTMENT (OUTPATIENT)
Dept: RADIOLOGY | Facility: IMAGING CENTER | Age: 84
End: 2024-11-19
Payer: MEDICARE

## 2024-11-19 ENCOUNTER — OUTPATIENT (OUTPATIENT)
Dept: OUTPATIENT SERVICES | Facility: HOSPITAL | Age: 84
LOS: 1 days | End: 2024-11-19
Payer: MEDICARE

## 2024-11-19 DIAGNOSIS — Z13.820 ENCOUNTER FOR SCREENING FOR OSTEOPOROSIS: ICD-10-CM

## 2024-11-19 DIAGNOSIS — Z98.49 CATARACT EXTRACTION STATUS, UNSPECIFIED EYE: Chronic | ICD-10-CM

## 2024-11-19 PROCEDURE — 77080 DXA BONE DENSITY AXIAL: CPT | Mod: 26

## 2024-11-19 PROCEDURE — 77080 DXA BONE DENSITY AXIAL: CPT

## 2024-11-20 PROCEDURE — 77067 SCR MAMMO BI INCL CAD: CPT

## 2024-11-20 PROCEDURE — 77063 BREAST TOMOSYNTHESIS BI: CPT

## 2024-12-12 ENCOUNTER — NON-APPOINTMENT (OUTPATIENT)
Age: 84
End: 2024-12-12

## 2024-12-12 ENCOUNTER — APPOINTMENT (OUTPATIENT)
Dept: OPHTHALMOLOGY | Facility: CLINIC | Age: 84
End: 2024-12-12
Payer: MEDICARE

## 2024-12-12 PROCEDURE — 92012 INTRM OPH EXAM EST PATIENT: CPT

## 2024-12-17 ENCOUNTER — OUTPATIENT (OUTPATIENT)
Dept: OUTPATIENT SERVICES | Facility: HOSPITAL | Age: 84
LOS: 1 days | End: 2024-12-17
Payer: MEDICARE

## 2024-12-17 ENCOUNTER — APPOINTMENT (OUTPATIENT)
Dept: ULTRASOUND IMAGING | Facility: IMAGING CENTER | Age: 84
End: 2024-12-17
Payer: MEDICARE

## 2024-12-17 ENCOUNTER — APPOINTMENT (OUTPATIENT)
Dept: MAMMOGRAPHY | Facility: IMAGING CENTER | Age: 84
End: 2024-12-17
Payer: MEDICARE

## 2024-12-17 DIAGNOSIS — Z98.49 CATARACT EXTRACTION STATUS, UNSPECIFIED EYE: Chronic | ICD-10-CM

## 2024-12-17 DIAGNOSIS — R92.8 OTHER ABNORMAL AND INCONCLUSIVE FINDINGS ON DIAGNOSTIC IMAGING OF BREAST: ICD-10-CM

## 2024-12-17 PROCEDURE — 76642 ULTRASOUND BREAST LIMITED: CPT | Mod: 26,LT

## 2024-12-17 PROCEDURE — 77065 DX MAMMO INCL CAD UNI: CPT | Mod: 26,LT

## 2024-12-17 PROCEDURE — G0279: CPT

## 2024-12-17 PROCEDURE — 77065 DX MAMMO INCL CAD UNI: CPT

## 2024-12-17 PROCEDURE — 76642 ULTRASOUND BREAST LIMITED: CPT

## 2024-12-17 PROCEDURE — G0279: CPT | Mod: 26

## 2025-04-09 ENCOUNTER — INPATIENT (INPATIENT)
Facility: HOSPITAL | Age: 85
LOS: 2 days | Discharge: HOME HEALTH | End: 2025-04-12
Attending: HOSPITALIST | Admitting: HOSPITALIST
Payer: MEDICARE

## 2025-04-09 VITALS
RESPIRATION RATE: 18 BRPM | DIASTOLIC BLOOD PRESSURE: 75 MMHG | HEART RATE: 74 BPM | OXYGEN SATURATION: 96 % | TEMPERATURE: 98 F | SYSTOLIC BLOOD PRESSURE: 122 MMHG

## 2025-04-09 DIAGNOSIS — R55 SYNCOPE AND COLLAPSE: ICD-10-CM

## 2025-04-09 DIAGNOSIS — E11.9 TYPE 2 DIABETES MELLITUS WITHOUT COMPLICATIONS: ICD-10-CM

## 2025-04-09 DIAGNOSIS — Z98.49 CATARACT EXTRACTION STATUS, UNSPECIFIED EYE: Chronic | ICD-10-CM

## 2025-04-09 DIAGNOSIS — I10 ESSENTIAL (PRIMARY) HYPERTENSION: ICD-10-CM

## 2025-04-09 DIAGNOSIS — H40.9 UNSPECIFIED GLAUCOMA: ICD-10-CM

## 2025-04-09 DIAGNOSIS — I48.0 PAROXYSMAL ATRIAL FIBRILLATION: ICD-10-CM

## 2025-04-09 DIAGNOSIS — N39.0 URINARY TRACT INFECTION, SITE NOT SPECIFIED: ICD-10-CM

## 2025-04-09 DIAGNOSIS — N17.9 ACUTE KIDNEY FAILURE, UNSPECIFIED: ICD-10-CM

## 2025-04-09 LAB
ALBUMIN SERPL ELPH-MCNC: 4.1 G/DL — SIGNIFICANT CHANGE UP (ref 3.3–5)
ALP SERPL-CCNC: 75 U/L — SIGNIFICANT CHANGE UP (ref 40–120)
ALT FLD-CCNC: 11 U/L — SIGNIFICANT CHANGE UP (ref 4–33)
ANION GAP SERPL CALC-SCNC: 13 MMOL/L — SIGNIFICANT CHANGE UP (ref 7–14)
APPEARANCE UR: ABNORMAL
AST SERPL-CCNC: 15 U/L — SIGNIFICANT CHANGE UP (ref 4–32)
BACTERIA # UR AUTO: ABNORMAL /HPF
BASOPHILS # BLD AUTO: 0.04 K/UL — SIGNIFICANT CHANGE UP (ref 0–0.2)
BASOPHILS NFR BLD AUTO: 0.4 % — SIGNIFICANT CHANGE UP (ref 0–2)
BILIRUB SERPL-MCNC: 0.6 MG/DL — SIGNIFICANT CHANGE UP (ref 0.2–1.2)
BILIRUB UR-MCNC: NEGATIVE — SIGNIFICANT CHANGE UP
BUN SERPL-MCNC: 34 MG/DL — HIGH (ref 7–23)
CALCIUM SERPL-MCNC: 10 MG/DL — SIGNIFICANT CHANGE UP (ref 8.4–10.5)
CAST: 39 /LPF — HIGH (ref 0–4)
CHLORIDE SERPL-SCNC: 99 MMOL/L — SIGNIFICANT CHANGE UP (ref 98–107)
CO2 SERPL-SCNC: 25 MMOL/L — SIGNIFICANT CHANGE UP (ref 22–31)
COLOR SPEC: YELLOW — SIGNIFICANT CHANGE UP
CREAT SERPL-MCNC: 2.03 MG/DL — HIGH (ref 0.5–1.3)
DIFF PNL FLD: ABNORMAL
EGFR: 24 ML/MIN/1.73M2 — LOW
EGFR: 24 ML/MIN/1.73M2 — LOW
EOSINOPHIL # BLD AUTO: 0.26 K/UL — SIGNIFICANT CHANGE UP (ref 0–0.5)
EOSINOPHIL NFR BLD AUTO: 2.7 % — SIGNIFICANT CHANGE UP (ref 0–6)
GLUCOSE BLDC GLUCOMTR-MCNC: 105 MG/DL — HIGH (ref 70–99)
GLUCOSE BLDC GLUCOMTR-MCNC: 129 MG/DL — HIGH (ref 70–99)
GLUCOSE BLDC GLUCOMTR-MCNC: 97 MG/DL — SIGNIFICANT CHANGE UP (ref 70–99)
GLUCOSE SERPL-MCNC: 104 MG/DL — HIGH (ref 70–99)
GLUCOSE UR QL: >=1000 MG/DL
HCT VFR BLD CALC: 37.6 % — SIGNIFICANT CHANGE UP (ref 34.5–45)
HGB BLD-MCNC: 12.2 G/DL — SIGNIFICANT CHANGE UP (ref 11.5–15.5)
IANC: 6.29 K/UL — SIGNIFICANT CHANGE UP (ref 1.8–7.4)
IMM GRANULOCYTES NFR BLD AUTO: 0.4 % — SIGNIFICANT CHANGE UP (ref 0–0.9)
KETONES UR-MCNC: ABNORMAL MG/DL
LEUKOCYTE ESTERASE UR-ACNC: ABNORMAL
LYMPHOCYTES # BLD AUTO: 2.02 K/UL — SIGNIFICANT CHANGE UP (ref 1–3.3)
LYMPHOCYTES # BLD AUTO: 21.1 % — SIGNIFICANT CHANGE UP (ref 13–44)
MAGNESIUM SERPL-MCNC: 2.2 MG/DL — SIGNIFICANT CHANGE UP (ref 1.6–2.6)
MCHC RBC-ENTMCNC: 28 PG — SIGNIFICANT CHANGE UP (ref 27–34)
MCHC RBC-ENTMCNC: 32.4 G/DL — SIGNIFICANT CHANGE UP (ref 32–36)
MCV RBC AUTO: 86.4 FL — SIGNIFICANT CHANGE UP (ref 80–100)
MONOCYTES # BLD AUTO: 0.93 K/UL — HIGH (ref 0–0.9)
MONOCYTES NFR BLD AUTO: 9.7 % — SIGNIFICANT CHANGE UP (ref 2–14)
NEUTROPHILS # BLD AUTO: 6.29 K/UL — SIGNIFICANT CHANGE UP (ref 1.8–7.4)
NEUTROPHILS NFR BLD AUTO: 65.7 % — SIGNIFICANT CHANGE UP (ref 43–77)
NITRITE UR-MCNC: NEGATIVE — SIGNIFICANT CHANGE UP
NRBC # BLD AUTO: 0 K/UL — SIGNIFICANT CHANGE UP (ref 0–0)
NRBC # FLD: 0 K/UL — SIGNIFICANT CHANGE UP (ref 0–0)
NRBC BLD AUTO-RTO: 0 /100 WBCS — SIGNIFICANT CHANGE UP (ref 0–0)
PH UR: 6 — SIGNIFICANT CHANGE UP (ref 5–8)
PHOSPHATE SERPL-MCNC: 2.6 MG/DL — SIGNIFICANT CHANGE UP (ref 2.5–4.5)
PLATELET # BLD AUTO: 286 K/UL — SIGNIFICANT CHANGE UP (ref 150–400)
POTASSIUM SERPL-MCNC: 3.7 MMOL/L — SIGNIFICANT CHANGE UP (ref 3.5–5.3)
POTASSIUM SERPL-SCNC: 3.7 MMOL/L — SIGNIFICANT CHANGE UP (ref 3.5–5.3)
PROT SERPL-MCNC: 7.4 G/DL — SIGNIFICANT CHANGE UP (ref 6–8.3)
PROT UR-MCNC: 100 MG/DL
RBC # BLD: 4.35 M/UL — SIGNIFICANT CHANGE UP (ref 3.8–5.2)
RBC # FLD: 14.7 % — HIGH (ref 10.3–14.5)
RBC CASTS # UR COMP ASSIST: 1 /HPF — SIGNIFICANT CHANGE UP (ref 0–4)
REVIEW: SIGNIFICANT CHANGE UP
SODIUM SERPL-SCNC: 137 MMOL/L — SIGNIFICANT CHANGE UP (ref 135–145)
SP GR SPEC: 1.02 — SIGNIFICANT CHANGE UP (ref 1–1.03)
SQUAMOUS # UR AUTO: 7 /HPF — HIGH (ref 0–5)
TROPONIN T, HIGH SENSITIVITY RESULT: 18 NG/L — SIGNIFICANT CHANGE UP
TROPONIN T, HIGH SENSITIVITY RESULT: 23 NG/L — SIGNIFICANT CHANGE UP
UROBILINOGEN FLD QL: 1 MG/DL — SIGNIFICANT CHANGE UP (ref 0.2–1)
WBC # BLD: 9.58 K/UL — SIGNIFICANT CHANGE UP (ref 3.8–10.5)
WBC # FLD AUTO: 9.58 K/UL — SIGNIFICANT CHANGE UP (ref 3.8–10.5)
WBC UR QL: 131 /HPF — HIGH (ref 0–5)

## 2025-04-09 PROCEDURE — 99285 EMERGENCY DEPT VISIT HI MDM: CPT | Mod: FS

## 2025-04-09 PROCEDURE — 71046 X-RAY EXAM CHEST 2 VIEWS: CPT | Mod: 26

## 2025-04-09 PROCEDURE — 99223 1ST HOSP IP/OBS HIGH 75: CPT

## 2025-04-09 RX ORDER — ROSUVASTATIN CALCIUM 20 MG/1
1 TABLET, FILM COATED ORAL
Refills: 0 | DISCHARGE

## 2025-04-09 RX ORDER — SODIUM CHLORIDE 9 G/1000ML
1000 INJECTION, SOLUTION INTRAVENOUS
Refills: 0 | Status: DISCONTINUED | OUTPATIENT
Start: 2025-04-09 | End: 2025-04-12

## 2025-04-09 RX ORDER — MELATONIN 5 MG
3 TABLET ORAL AT BEDTIME
Refills: 0 | Status: DISCONTINUED | OUTPATIENT
Start: 2025-04-09 | End: 2025-04-12

## 2025-04-09 RX ORDER — EMPAGLIFLOZIN 25 MG/1
1 TABLET, FILM COATED ORAL
Refills: 0 | DISCHARGE

## 2025-04-09 RX ORDER — CEFTRIAXONE 500 MG/1
1000 INJECTION, POWDER, FOR SOLUTION INTRAMUSCULAR; INTRAVENOUS EVERY 24 HOURS
Refills: 0 | Status: COMPLETED | OUTPATIENT
Start: 2025-04-09 | End: 2025-04-11

## 2025-04-09 RX ORDER — CARVEDILOL 3.12 MG/1
1 TABLET, FILM COATED ORAL
Refills: 0 | DISCHARGE

## 2025-04-09 RX ORDER — RIVAROXABAN 10 MG/1
2.5 TABLET, FILM COATED ORAL
Refills: 0 | Status: DISCONTINUED | OUTPATIENT
Start: 2025-04-09 | End: 2025-04-11

## 2025-04-09 RX ORDER — TIMOLOL MALEATE 6.8 MG/ML
1 SOLUTION OPHTHALMIC
Refills: 0 | Status: DISCONTINUED | OUTPATIENT
Start: 2025-04-09 | End: 2025-04-12

## 2025-04-09 RX ORDER — DEXTROSE 50 % IN WATER 50 %
25 SYRINGE (ML) INTRAVENOUS ONCE
Refills: 0 | Status: DISCONTINUED | OUTPATIENT
Start: 2025-04-09 | End: 2025-04-12

## 2025-04-09 RX ORDER — DEXTROSE 50 % IN WATER 50 %
12.5 SYRINGE (ML) INTRAVENOUS ONCE
Refills: 0 | Status: DISCONTINUED | OUTPATIENT
Start: 2025-04-09 | End: 2025-04-12

## 2025-04-09 RX ORDER — ACETAMINOPHEN 500 MG/5ML
650 LIQUID (ML) ORAL EVERY 6 HOURS
Refills: 0 | Status: DISCONTINUED | OUTPATIENT
Start: 2025-04-09 | End: 2025-04-12

## 2025-04-09 RX ORDER — CARVEDILOL 3.12 MG/1
3.12 TABLET, FILM COATED ORAL EVERY 12 HOURS
Refills: 0 | Status: DISCONTINUED | OUTPATIENT
Start: 2025-04-09 | End: 2025-04-12

## 2025-04-09 RX ORDER — BRIMONIDINE TARTRATE 1.5 MG/ML
1 SOLUTION/ DROPS OPHTHALMIC
Refills: 0 | Status: DISCONTINUED | OUTPATIENT
Start: 2025-04-09 | End: 2025-04-12

## 2025-04-09 RX ORDER — DEXTROSE 50 % IN WATER 50 %
15 SYRINGE (ML) INTRAVENOUS ONCE
Refills: 0 | Status: DISCONTINUED | OUTPATIENT
Start: 2025-04-09 | End: 2025-04-12

## 2025-04-09 RX ORDER — ROSUVASTATIN CALCIUM 20 MG/1
40 TABLET, FILM COATED ORAL AT BEDTIME
Refills: 0 | Status: DISCONTINUED | OUTPATIENT
Start: 2025-04-09 | End: 2025-04-12

## 2025-04-09 RX ORDER — INSULIN LISPRO 100 U/ML
INJECTION, SOLUTION INTRAVENOUS; SUBCUTANEOUS
Refills: 0 | Status: DISCONTINUED | OUTPATIENT
Start: 2025-04-09 | End: 2025-04-12

## 2025-04-09 RX ORDER — GLUCAGON 3 MG/1
1 POWDER NASAL ONCE
Refills: 0 | Status: DISCONTINUED | OUTPATIENT
Start: 2025-04-09 | End: 2025-04-12

## 2025-04-09 RX ORDER — DORZOLAMIDE 20 MG/ML
1 SOLUTION/ DROPS OPHTHALMIC
Refills: 0 | Status: DISCONTINUED | OUTPATIENT
Start: 2025-04-09 | End: 2025-04-12

## 2025-04-09 RX ORDER — INSULIN LISPRO 100 U/ML
INJECTION, SOLUTION INTRAVENOUS; SUBCUTANEOUS AT BEDTIME
Refills: 0 | Status: DISCONTINUED | OUTPATIENT
Start: 2025-04-09 | End: 2025-04-12

## 2025-04-09 RX ORDER — LATANOPROST PF 0.05 MG/ML
1 SOLUTION/ DROPS OPHTHALMIC AT BEDTIME
Refills: 0 | Status: DISCONTINUED | OUTPATIENT
Start: 2025-04-09 | End: 2025-04-12

## 2025-04-09 RX ADMIN — LATANOPROST PF 1 DROP(S): 0.05 SOLUTION/ DROPS OPHTHALMIC at 22:13

## 2025-04-09 RX ADMIN — DORZOLAMIDE 1 DROP(S): 20 SOLUTION/ DROPS OPHTHALMIC at 22:10

## 2025-04-09 RX ADMIN — Medication 1000 MILLILITER(S): at 19:40

## 2025-04-09 RX ADMIN — TIMOLOL MALEATE 1 DROP(S): 6.8 SOLUTION OPHTHALMIC at 22:11

## 2025-04-09 RX ADMIN — RIVAROXABAN 2.5 MILLIGRAM(S): 10 TABLET, FILM COATED ORAL at 22:14

## 2025-04-09 RX ADMIN — BRIMONIDINE TARTRATE 1 DROP(S): 1.5 SOLUTION/ DROPS OPHTHALMIC at 22:13

## 2025-04-09 RX ADMIN — Medication 1000 MILLILITER(S): at 15:20

## 2025-04-09 RX ADMIN — ROSUVASTATIN CALCIUM 40 MILLIGRAM(S): 20 TABLET, FILM COATED ORAL at 22:14

## 2025-04-09 RX ADMIN — CEFTRIAXONE 100 MILLIGRAM(S): 500 INJECTION, POWDER, FOR SOLUTION INTRAMUSCULAR; INTRAVENOUS at 19:43

## 2025-04-10 LAB
ALBUMIN SERPL ELPH-MCNC: 3.7 G/DL — SIGNIFICANT CHANGE UP (ref 3.3–5)
ALP SERPL-CCNC: 65 U/L — SIGNIFICANT CHANGE UP (ref 40–120)
ALT FLD-CCNC: 11 U/L — SIGNIFICANT CHANGE UP (ref 4–33)
ANION GAP SERPL CALC-SCNC: 13 MMOL/L — SIGNIFICANT CHANGE UP (ref 7–14)
AST SERPL-CCNC: 20 U/L — SIGNIFICANT CHANGE UP (ref 4–32)
BASOPHILS # BLD AUTO: 0.04 K/UL — SIGNIFICANT CHANGE UP (ref 0–0.2)
BASOPHILS NFR BLD AUTO: 0.5 % — SIGNIFICANT CHANGE UP (ref 0–2)
BILIRUB SERPL-MCNC: 0.3 MG/DL — SIGNIFICANT CHANGE UP (ref 0.2–1.2)
BILIRUB SERPL-MCNC: 0.4 MG/DL — SIGNIFICANT CHANGE UP (ref 0.2–1.2)
BUN SERPL-MCNC: 25 MG/DL — HIGH (ref 7–23)
CALCIUM SERPL-MCNC: 9.1 MG/DL — SIGNIFICANT CHANGE UP (ref 8.4–10.5)
CALCIUM SERPL-MCNC: 9.4 MG/DL — SIGNIFICANT CHANGE UP (ref 8.4–10.5)
CHLORIDE SERPL-SCNC: 103 MMOL/L — SIGNIFICANT CHANGE UP (ref 98–107)
CHLORIDE SERPL-SCNC: 105 MMOL/L — SIGNIFICANT CHANGE UP (ref 98–107)
CO2 SERPL-SCNC: 21 MMOL/L — LOW (ref 22–31)
CREAT SERPL-MCNC: 1.67 MG/DL — HIGH (ref 0.5–1.3)
EGFR: 30 ML/MIN/1.73M2 — LOW
EGFR: 30 ML/MIN/1.73M2 — LOW
EOSINOPHIL # BLD AUTO: 0.25 K/UL — SIGNIFICANT CHANGE UP (ref 0–0.5)
EOSINOPHIL NFR BLD AUTO: 3.3 % — SIGNIFICANT CHANGE UP (ref 0–6)
GLUCOSE BLDC GLUCOMTR-MCNC: 80 MG/DL — SIGNIFICANT CHANGE UP (ref 70–99)
GLUCOSE SERPL-MCNC: 85 MG/DL — SIGNIFICANT CHANGE UP (ref 70–99)
HCT VFR BLD CALC: 35.9 % — SIGNIFICANT CHANGE UP (ref 34.5–45)
HGB BLD-MCNC: 11.6 G/DL — SIGNIFICANT CHANGE UP (ref 11.5–15.5)
IANC: 3.79 K/UL — SIGNIFICANT CHANGE UP (ref 1.8–7.4)
IMM GRANULOCYTES NFR BLD AUTO: 0.4 % — SIGNIFICANT CHANGE UP (ref 0–0.9)
LYMPHOCYTES # BLD AUTO: 2.67 K/UL — SIGNIFICANT CHANGE UP (ref 1–3.3)
LYMPHOCYTES # BLD AUTO: 35 % — SIGNIFICANT CHANGE UP (ref 13–44)
MAGNESIUM SERPL-MCNC: 2.1 MG/DL — SIGNIFICANT CHANGE UP (ref 1.6–2.6)
MCHC RBC-ENTMCNC: 27.8 PG — SIGNIFICANT CHANGE UP (ref 27–34)
MCHC RBC-ENTMCNC: 32.3 G/DL — SIGNIFICANT CHANGE UP (ref 32–36)
MCV RBC AUTO: 85.9 FL — SIGNIFICANT CHANGE UP (ref 80–100)
MONOCYTES # BLD AUTO: 0.84 K/UL — SIGNIFICANT CHANGE UP (ref 0–0.9)
MONOCYTES NFR BLD AUTO: 11 % — SIGNIFICANT CHANGE UP (ref 2–14)
MRSA PCR RESULT.: SIGNIFICANT CHANGE UP
NEUTROPHILS # BLD AUTO: 3.79 K/UL — SIGNIFICANT CHANGE UP (ref 1.8–7.4)
NEUTROPHILS NFR BLD AUTO: 49.8 % — SIGNIFICANT CHANGE UP (ref 43–77)
NRBC # BLD AUTO: 0 K/UL — SIGNIFICANT CHANGE UP (ref 0–0)
NRBC # FLD: 0 K/UL — SIGNIFICANT CHANGE UP (ref 0–0)
NRBC BLD AUTO-RTO: 0 /100 WBCS — SIGNIFICANT CHANGE UP (ref 0–0)
PHOSPHATE SERPL-MCNC: 2.8 MG/DL — SIGNIFICANT CHANGE UP (ref 2.5–4.5)
PLATELET # BLD AUTO: 179 K/UL — SIGNIFICANT CHANGE UP (ref 150–400)
POTASSIUM SERPL-MCNC: 3.6 MMOL/L — SIGNIFICANT CHANGE UP (ref 3.5–5.3)
POTASSIUM SERPL-MCNC: 3.7 MMOL/L — SIGNIFICANT CHANGE UP (ref 3.5–5.3)
POTASSIUM SERPL-SCNC: 3.6 MMOL/L — SIGNIFICANT CHANGE UP (ref 3.5–5.3)
POTASSIUM SERPL-SCNC: 3.7 MMOL/L — SIGNIFICANT CHANGE UP (ref 3.5–5.3)
PROT SERPL-MCNC: 6.7 G/DL — SIGNIFICANT CHANGE UP (ref 6–8.3)
RBC # BLD: 4.18 M/UL — SIGNIFICANT CHANGE UP (ref 3.8–5.2)
RBC # FLD: 14.7 % — HIGH (ref 10.3–14.5)
S AUREUS DNA NOSE QL NAA+PROBE: SIGNIFICANT CHANGE UP
SODIUM SERPL-SCNC: 137 MMOL/L — SIGNIFICANT CHANGE UP (ref 135–145)
SODIUM SERPL-SCNC: 139 MMOL/L — SIGNIFICANT CHANGE UP (ref 135–145)
WBC # BLD: 7.62 K/UL — SIGNIFICANT CHANGE UP (ref 3.8–10.5)
WBC # FLD AUTO: 7.62 K/UL — SIGNIFICANT CHANGE UP (ref 3.8–10.5)

## 2025-04-10 PROCEDURE — 99233 SBSQ HOSP IP/OBS HIGH 50: CPT

## 2025-04-10 RX ORDER — INFLUENZA A VIRUS A/IDAHO/07/2018 (H1N1) ANTIGEN (MDCK CELL DERIVED, PROPIOLACTONE INACTIVATED, INFLUENZA A VIRUS A/INDIANA/08/2018 (H3N2) ANTIGEN (MDCK CELL DERIVED, PROPIOLACTONE INACTIVATED), INFLUENZA B VIRUS B/SINGAPORE/INFTT-16-0610/2016 ANTIGEN (MDCK CELL DERIVED, PROPIOLACTONE INACTIVATED), INFLUENZA B VIRUS B/IOWA/06/2017 ANTIGEN (MDCK CELL DERIVED, PROPIOLACTONE INACTIVATED) 15; 15; 15; 15 UG/.5ML; UG/.5ML; UG/.5ML; UG/.5ML
0.5 INJECTION, SUSPENSION INTRAMUSCULAR ONCE
Refills: 0 | Status: DISCONTINUED | OUTPATIENT
Start: 2025-04-10 | End: 2025-04-12

## 2025-04-10 RX ADMIN — LATANOPROST PF 1 DROP(S): 0.05 SOLUTION/ DROPS OPHTHALMIC at 21:25

## 2025-04-10 RX ADMIN — DORZOLAMIDE 1 DROP(S): 20 SOLUTION/ DROPS OPHTHALMIC at 08:30

## 2025-04-10 RX ADMIN — BRIMONIDINE TARTRATE 1 DROP(S): 1.5 SOLUTION/ DROPS OPHTHALMIC at 18:36

## 2025-04-10 RX ADMIN — ROSUVASTATIN CALCIUM 40 MILLIGRAM(S): 20 TABLET, FILM COATED ORAL at 21:26

## 2025-04-10 RX ADMIN — CEFTRIAXONE 100 MILLIGRAM(S): 500 INJECTION, POWDER, FOR SOLUTION INTRAMUSCULAR; INTRAVENOUS at 19:00

## 2025-04-10 RX ADMIN — DORZOLAMIDE 1 DROP(S): 20 SOLUTION/ DROPS OPHTHALMIC at 21:25

## 2025-04-10 RX ADMIN — TIMOLOL MALEATE 1 DROP(S): 6.8 SOLUTION OPHTHALMIC at 18:37

## 2025-04-10 RX ADMIN — BRIMONIDINE TARTRATE 1 DROP(S): 1.5 SOLUTION/ DROPS OPHTHALMIC at 05:15

## 2025-04-10 RX ADMIN — TIMOLOL MALEATE 1 DROP(S): 6.8 SOLUTION OPHTHALMIC at 05:15

## 2025-04-10 RX ADMIN — RIVAROXABAN 2.5 MILLIGRAM(S): 10 TABLET, FILM COATED ORAL at 18:37

## 2025-04-10 RX ADMIN — Medication 1 APPLICATION(S): at 18:37

## 2025-04-10 RX ADMIN — CARVEDILOL 3.12 MILLIGRAM(S): 3.12 TABLET, FILM COATED ORAL at 05:17

## 2025-04-10 RX ADMIN — Medication 100 MILLILITER(S): at 00:53

## 2025-04-10 RX ADMIN — RIVAROXABAN 2.5 MILLIGRAM(S): 10 TABLET, FILM COATED ORAL at 10:16

## 2025-04-11 ENCOUNTER — TRANSCRIPTION ENCOUNTER (OUTPATIENT)
Age: 85
End: 2025-04-11

## 2025-04-11 LAB
ALBUMIN SERPL ELPH-MCNC: 3.9 G/DL — SIGNIFICANT CHANGE UP (ref 3.3–5)
ALBUMIN SERPL ELPH-MCNC: 4.1 G/DL — SIGNIFICANT CHANGE UP (ref 3.3–5)
ALP SERPL-CCNC: 68 U/L — SIGNIFICANT CHANGE UP (ref 40–120)
ALP SERPL-CCNC: 74 U/L — SIGNIFICANT CHANGE UP (ref 40–120)
ALT FLD-CCNC: 11 U/L — SIGNIFICANT CHANGE UP (ref 4–33)
ALT FLD-CCNC: 13 U/L — SIGNIFICANT CHANGE UP (ref 4–33)
ANION GAP SERPL CALC-SCNC: 10 MMOL/L — SIGNIFICANT CHANGE UP (ref 7–14)
ANION GAP SERPL CALC-SCNC: 14 MMOL/L — SIGNIFICANT CHANGE UP (ref 7–14)
AST SERPL-CCNC: 18 U/L — SIGNIFICANT CHANGE UP (ref 4–32)
AST SERPL-CCNC: 20 U/L — SIGNIFICANT CHANGE UP (ref 4–32)
BILIRUB SERPL-MCNC: 0.4 MG/DL — SIGNIFICANT CHANGE UP (ref 0.2–1.2)
BUN SERPL-MCNC: 23 MG/DL — SIGNIFICANT CHANGE UP (ref 7–23)
BUN SERPL-MCNC: 25 MG/DL — HIGH (ref 7–23)
CALCIUM SERPL-MCNC: 9.6 MG/DL — SIGNIFICANT CHANGE UP (ref 8.4–10.5)
CHLORIDE SERPL-SCNC: 101 MMOL/L — SIGNIFICANT CHANGE UP (ref 98–107)
CO2 SERPL-SCNC: 21 MMOL/L — LOW (ref 22–31)
CO2 SERPL-SCNC: 24 MMOL/L — SIGNIFICANT CHANGE UP (ref 22–31)
CREAT SERPL-MCNC: 1.6 MG/DL — HIGH (ref 0.5–1.3)
CREAT SERPL-MCNC: 1.73 MG/DL — HIGH (ref 0.5–1.3)
CULTURE RESULTS: ABNORMAL
EGFR: 29 ML/MIN/1.73M2 — LOW
EGFR: 29 ML/MIN/1.73M2 — LOW
EGFR: 31 ML/MIN/1.73M2 — LOW
EGFR: 31 ML/MIN/1.73M2 — LOW
GLUCOSE SERPL-MCNC: 72 MG/DL — SIGNIFICANT CHANGE UP (ref 70–99)
GLUCOSE SERPL-MCNC: 92 MG/DL — SIGNIFICANT CHANGE UP (ref 70–99)
HCT VFR BLD CALC: 38.8 % — SIGNIFICANT CHANGE UP (ref 34.5–45)
HGB BLD-MCNC: 12.5 G/DL — SIGNIFICANT CHANGE UP (ref 11.5–15.5)
MAGNESIUM SERPL-MCNC: 2.2 MG/DL — SIGNIFICANT CHANGE UP (ref 1.6–2.6)
MCHC RBC-ENTMCNC: 27.4 PG — SIGNIFICANT CHANGE UP (ref 27–34)
MCHC RBC-ENTMCNC: 32.2 G/DL — SIGNIFICANT CHANGE UP (ref 32–36)
MCV RBC AUTO: 85.1 FL — SIGNIFICANT CHANGE UP (ref 80–100)
NRBC # BLD AUTO: 0 K/UL — SIGNIFICANT CHANGE UP (ref 0–0)
NRBC # FLD: 0 K/UL — SIGNIFICANT CHANGE UP (ref 0–0)
NRBC BLD AUTO-RTO: 0 /100 WBCS — SIGNIFICANT CHANGE UP (ref 0–0)
PHOSPHATE SERPL-MCNC: 2.6 MG/DL — SIGNIFICANT CHANGE UP (ref 2.5–4.5)
PLATELET # BLD AUTO: 271 K/UL — SIGNIFICANT CHANGE UP (ref 150–400)
POTASSIUM SERPL-MCNC: 3.2 MMOL/L — LOW (ref 3.5–5.3)
POTASSIUM SERPL-SCNC: 3.2 MMOL/L — LOW (ref 3.5–5.3)
PROT SERPL-MCNC: 7 G/DL — SIGNIFICANT CHANGE UP (ref 6–8.3)
PROT SERPL-MCNC: 7.6 G/DL — SIGNIFICANT CHANGE UP (ref 6–8.3)
RBC # BLD: 4.56 M/UL — SIGNIFICANT CHANGE UP (ref 3.8–5.2)
RBC # FLD: 14.7 % — HIGH (ref 10.3–14.5)
SODIUM SERPL-SCNC: 136 MMOL/L — SIGNIFICANT CHANGE UP (ref 135–145)
SPECIMEN SOURCE: SIGNIFICANT CHANGE UP
WBC # BLD: 6.76 K/UL — SIGNIFICANT CHANGE UP (ref 3.8–10.5)
WBC # FLD AUTO: 6.76 K/UL — SIGNIFICANT CHANGE UP (ref 3.8–10.5)

## 2025-04-11 PROCEDURE — 99233 SBSQ HOSP IP/OBS HIGH 50: CPT

## 2025-04-11 RX ORDER — APIXABAN 2.5 MG/1
2.5 TABLET, FILM COATED ORAL EVERY 12 HOURS
Refills: 0 | Status: DISCONTINUED | OUTPATIENT
Start: 2025-04-11 | End: 2025-04-12

## 2025-04-11 RX ORDER — APIXABAN 2.5 MG/1
1 TABLET, FILM COATED ORAL
Qty: 60 | Refills: 0
Start: 2025-04-11 | End: 2025-05-10

## 2025-04-11 RX ADMIN — CARVEDILOL 3.12 MILLIGRAM(S): 3.12 TABLET, FILM COATED ORAL at 18:05

## 2025-04-11 RX ADMIN — TIMOLOL MALEATE 1 DROP(S): 6.8 SOLUTION OPHTHALMIC at 05:04

## 2025-04-11 RX ADMIN — Medication 40 MILLIEQUIVALENT(S): at 11:52

## 2025-04-11 RX ADMIN — ROSUVASTATIN CALCIUM 40 MILLIGRAM(S): 20 TABLET, FILM COATED ORAL at 21:38

## 2025-04-11 RX ADMIN — BRIMONIDINE TARTRATE 1 DROP(S): 1.5 SOLUTION/ DROPS OPHTHALMIC at 18:06

## 2025-04-11 RX ADMIN — BRIMONIDINE TARTRATE 1 DROP(S): 1.5 SOLUTION/ DROPS OPHTHALMIC at 05:04

## 2025-04-11 RX ADMIN — DORZOLAMIDE 1 DROP(S): 20 SOLUTION/ DROPS OPHTHALMIC at 21:40

## 2025-04-11 RX ADMIN — DORZOLAMIDE 1 DROP(S): 20 SOLUTION/ DROPS OPHTHALMIC at 08:48

## 2025-04-11 RX ADMIN — APIXABAN 2.5 MILLIGRAM(S): 2.5 TABLET, FILM COATED ORAL at 18:04

## 2025-04-11 RX ADMIN — RIVAROXABAN 2.5 MILLIGRAM(S): 10 TABLET, FILM COATED ORAL at 08:49

## 2025-04-11 RX ADMIN — TIMOLOL MALEATE 1 DROP(S): 6.8 SOLUTION OPHTHALMIC at 18:06

## 2025-04-11 RX ADMIN — LATANOPROST PF 1 DROP(S): 0.05 SOLUTION/ DROPS OPHTHALMIC at 21:40

## 2025-04-11 RX ADMIN — Medication 1 APPLICATION(S): at 11:58

## 2025-04-11 RX ADMIN — CEFTRIAXONE 100 MILLIGRAM(S): 500 INJECTION, POWDER, FOR SOLUTION INTRAMUSCULAR; INTRAVENOUS at 19:06

## 2025-04-12 VITALS
RESPIRATION RATE: 16 BRPM | TEMPERATURE: 98 F | OXYGEN SATURATION: 100 % | SYSTOLIC BLOOD PRESSURE: 110 MMHG | DIASTOLIC BLOOD PRESSURE: 59 MMHG | HEART RATE: 64 BPM

## 2025-04-12 LAB
ANION GAP SERPL CALC-SCNC: 12 MMOL/L — SIGNIFICANT CHANGE UP (ref 7–14)
BUN SERPL-MCNC: 21 MG/DL — SIGNIFICANT CHANGE UP (ref 7–23)
CALCIUM SERPL-MCNC: 9.5 MG/DL — SIGNIFICANT CHANGE UP (ref 8.4–10.5)
CHLORIDE SERPL-SCNC: 106 MMOL/L — SIGNIFICANT CHANGE UP (ref 98–107)
CO2 SERPL-SCNC: 19 MMOL/L — LOW (ref 22–31)
CREAT SERPL-MCNC: 1.53 MG/DL — HIGH (ref 0.5–1.3)
EGFR: 33 ML/MIN/1.73M2 — LOW
EGFR: 33 ML/MIN/1.73M2 — LOW
GLUCOSE SERPL-MCNC: 78 MG/DL — SIGNIFICANT CHANGE UP (ref 70–99)
HCT VFR BLD CALC: 37.8 % — SIGNIFICANT CHANGE UP (ref 34.5–45)
HGB BLD-MCNC: 12.2 G/DL — SIGNIFICANT CHANGE UP (ref 11.5–15.5)
MAGNESIUM SERPL-MCNC: 2.1 MG/DL — SIGNIFICANT CHANGE UP (ref 1.6–2.6)
MCHC RBC-ENTMCNC: 27.9 PG — SIGNIFICANT CHANGE UP (ref 27–34)
MCHC RBC-ENTMCNC: 32.3 G/DL — SIGNIFICANT CHANGE UP (ref 32–36)
MCV RBC AUTO: 86.3 FL — SIGNIFICANT CHANGE UP (ref 80–100)
NRBC # BLD AUTO: 0 K/UL — SIGNIFICANT CHANGE UP (ref 0–0)
NRBC # FLD: 0 K/UL — SIGNIFICANT CHANGE UP (ref 0–0)
NRBC BLD AUTO-RTO: 0 /100 WBCS — SIGNIFICANT CHANGE UP (ref 0–0)
PLATELET # BLD AUTO: 243 K/UL — SIGNIFICANT CHANGE UP (ref 150–400)
POTASSIUM SERPL-MCNC: 4.2 MMOL/L — SIGNIFICANT CHANGE UP (ref 3.5–5.3)
POTASSIUM SERPL-SCNC: 4.2 MMOL/L — SIGNIFICANT CHANGE UP (ref 3.5–5.3)
RBC # BLD: 4.38 M/UL — SIGNIFICANT CHANGE UP (ref 3.8–5.2)
RBC # FLD: 14.8 % — HIGH (ref 10.3–14.5)
SODIUM SERPL-SCNC: 137 MMOL/L — SIGNIFICANT CHANGE UP (ref 135–145)
WBC # BLD: 7.95 K/UL — SIGNIFICANT CHANGE UP (ref 3.8–10.5)
WBC # FLD AUTO: 7.95 K/UL — SIGNIFICANT CHANGE UP (ref 3.8–10.5)

## 2025-04-12 PROCEDURE — 99239 HOSP IP/OBS DSCHRG MGMT >30: CPT

## 2025-04-12 RX ADMIN — APIXABAN 2.5 MILLIGRAM(S): 2.5 TABLET, FILM COATED ORAL at 05:58

## 2025-04-12 RX ADMIN — BRIMONIDINE TARTRATE 1 DROP(S): 1.5 SOLUTION/ DROPS OPHTHALMIC at 05:59

## 2025-04-12 RX ADMIN — DORZOLAMIDE 1 DROP(S): 20 SOLUTION/ DROPS OPHTHALMIC at 09:21

## 2025-04-12 RX ADMIN — CARVEDILOL 3.12 MILLIGRAM(S): 3.12 TABLET, FILM COATED ORAL at 05:58

## 2025-04-12 RX ADMIN — Medication 1 APPLICATION(S): at 11:42

## 2025-04-12 RX ADMIN — TIMOLOL MALEATE 1 DROP(S): 6.8 SOLUTION OPHTHALMIC at 05:59

## 2025-04-24 ENCOUNTER — APPOINTMENT (OUTPATIENT)
Dept: OPHTHALMOLOGY | Facility: CLINIC | Age: 85
End: 2025-04-24
Payer: MEDICARE

## 2025-04-24 ENCOUNTER — NON-APPOINTMENT (OUTPATIENT)
Age: 85
End: 2025-04-24

## 2025-04-24 PROCEDURE — 92083 EXTENDED VISUAL FIELD XM: CPT

## 2025-04-24 PROCEDURE — 92133 CPTRZD OPH DX IMG PST SGM ON: CPT

## 2025-04-24 PROCEDURE — 92012 INTRM OPH EXAM EST PATIENT: CPT

## 2025-07-11 NOTE — PATIENT PROFILE ADULT - FUNCTIONAL ASSESSMENT - BASIC MOBILITY 5.
History     Chief Complaint   Patient presents with    Facial Swelling     HPI    History obtained from patient and mother.    Yola is a 9 year old who presents at 12:10 AM with canine dental pain and face swelling.    Yola started having left sided upper dental pain at canine for 6 days or more, which started worsening 2 days ago into left facial swelling.     She was seen by her dentist Tuesday 7/8 who reported normal exam and dental XR.     She was seen in the ED yesterday 7/9 for worsening pain. She had overall reassuring labs (normal WBC 10.9, CRP 10.9, normal amylase). Also had a CT that showed left cheek cellulitis without abscess. She was given a dose of IV clindamycin and then sent home on PO clindamycin.     Came back to the ED today because her cheek swelling is worse and now she has a draining fluid collection in her gums above her left tooth (draining white fluid and blood) and it tastes badly. Has taken 3 doses of PO clindamycin at home. No fevers. She has been able to eat and drink. Denies dysphagia, sore throat, bad taste in mouth, neck pain, headaches, rash. Has been taking scheduled tylenol and ibuprofen at home, as well as oxycodone as needed.     PMHx:  History reviewed. No pertinent past medical history.  No past surgical history on file.  These were reviewed with the patient/family.    MEDICATIONS were reviewed and are as follows:   Current Facility-Administered Medications   Medication Dose Route Frequency Provider Last Rate Last Admin    ampicillin-sulbactam (UNASYN) 3 g vial to attach to  mL bag  50 mg/kg of ampicillin Intravenous Once Melissa Aguila MD   2,000 mg of ampicillin at 07/11/25 0149    BUPivacaine (MARCAINE) 0.5% preservative free injection  3 mL Dental Once Taylor Corado MD         Current Outpatient Medications   Medication Sig Dispense Refill    clindamycin (CLEOCIN) 150 MG capsule Take 2 capsules (300 mg) by mouth 3 times daily. 42 capsule 0        ALLERGIES:  Patient has no known allergies.    IMMUNIZATIONS: Up to date.       Physical Exam   BP: 118/80  Pulse: 97  Temp: 98.2  F (36.8  C)  Resp: 20  Weight: 39.1 kg (86 lb 3.2 oz)  SpO2: 100 %       Physical Exam  Appearance: Alert and appropriate, well developed, nontoxic, with moist mucous membranes.  HEENT: Head: Normocephalic and atraumatic. Left sided cheek swelling that extends from jaw area to below eye Eyes: Swelling under left eye, PERRL, EOM grossly intact, conjunctivae and sclerae clear. Ears: Tympanic membranes clear bilaterally, without inflammation or effusion. Nose: Swollen, violaceous, pencil eraser size gingival area above left lateral incisor to canine upper teeth, not actively draining, very localized without any buccal fullness/edema extending from the pustule appearing area Mouth/Throat: uvula midline, MMM, floor of the mouth soft and flat, no submandibular or parotid duct tenderness or edema  Neck: Supple, no masses, no meningismus. No significant cervical lymphadenopathy.  Pulmonary: No grunting, flaring, retractions or stridor. Good air entry, clear to auscultation bilaterally, with no rales, rhonchi, or wheezing.  Cardiovascular: Regular rate and rhythm, normal S1 and S2, with no murmurs.  Normal symmetric peripheral pulses and brisk cap refill.  Abdominal: Normal bowel sounds, soft, nontender, nondistended, with no masses and no hepatosplenomegaly.  Neurologic: Alert and oriented, cranial nerves II-XII grossly intact, moving all extremities equally with grossly normal coordination and normal gait.  Extremities/Back: No deformity, no CVA tenderness.  Skin: No significant rashes, ecchymoses, or lacerations.              ED Course       ED Course as of 07/11/25 0158   Fri Jul 11, 2025   0130 [facility] consulted for expanded ddx and involvement with decision to I&D or not in the ED and defer until tomorrow's in-person evaluation during admission. This is not behaving like a normal  abscess. She has had canine dental pain for a while,   has swollen left cheek without warm or intense erythema, focal swelling between left lateral incisor and canine that is violaceous rather than swollen gingival pink tissue, CT yesterday without sinus fluid or periapical abscess and previous dental visit without abscess or infection on XR/exam. Mom has been doing RTC antipyretics, but no fever either which is more unusual as well. Malignancy is certainly a ddx like a rhabdomyosarcoma or burkitt. Therefore, deferring I&D until [facility] consultation.   0142 [facility] agrees this is a bit of an outlier and they'd like to see her this morning and have her on IV Unasyn.     Procedures         Medications   BUPivacaine (MARCAINE) 0.5% preservative free injection (has no administration in time range)   ampicillin-sulbactam (UNASYN) 3 g vial to attach to  mL bag (2,000 mg of ampicillin Intravenous $New Bag 7/11/25 0149)   acetaminophen (TYLENOL) tablet 650 mg (650 mg Oral $Given 7/11/25 0125)   sodium chloride 0.9 % infusion (1,000 mLs  $New Bag 7/11/25 0149)   lidocaine 1 % (0.2 mLs  $Given 7/11/25 0145)       Critical care time:  none        Medical Decision Making  The patient's presentation was of high complexity (an acute health issue posing potential threat to life or bodily function).    The patient's evaluation involved:  an assessment requiring an independent historian (mother)  review of external note(s) from 2 sources ([facility] and [facility])  review of 1 test result(s) ordered prior to this encounter (CT scan)  strong consideration of a test (MRI) that was ultimately deferred  ordering and/or review of 1 test(s) in this encounter (labs)  independent interpretation of testing performed by another health professional (CT scan facial from 7/9)  Discussed case with consulting health professional from [facility].    The patient's management necessitated moderate risk (prescription drug management including medications given in the  ED), moderate risk (a decision regarding minor procedure (NA) with risk factors of none), and high risk (a decision regarding hospitalization).        Assessment & Plan     Yola is a 9 year old who presents with dental pain and face swelling. Differential includes dental abscess, sinusitis, ray angina, orbital cellulitis, nephrotic syndrome, Lemierre's syndrome, caries, osteomyelitis, cellulitis, parotitis, sialadenitis, malignancy (rhabdomyosarcoma or burkitt lymphoma). Amylase was not elevated yesterday, making parotitis less likely. She has not had any fevers and had a normal WBC yesterday. CT from yesterday showed no evidence of abscess or sinusitis, so it is interesting that her swelling seems worse today. Her case is a bit unique, as she has had canine dental pain for a while, she has focal swelling between left lateral incisor and canine that is violaceous rather than swollen gingival pink tissue. Discussed her case with [facility] and they agreed with waiting to drain the gingival mass until she can be evaluated by their team. She requires admission for IV antibiotics, [facility] consultation in the morning, and close clinical monitoring.     Plan:  - Admit for observation  - IV Unasyn  - [facility] to see in the morning  - Labs pending      New Prescriptions    No medications on file       Final diagnoses:   Swelling of left side of face   Gingival swelling     Attending Attestation:  I saw and evaluated this patient for limb or life threatening emergencies independently after discussing the history and physical, diagnostics, and plan with the resident. I reviewed and interpreted the diagnostic testing and discussed these findings with the resident. I agree that the above documentation accurately reflects the patient encounter. Parents verbalized understanding and agreement with the discharge plan and return precautions.  Taylor Corado MD  Attending Physician      Melissa Aguila, PGY3  Pediatrics, [facility]  [place]      7/11/2025   [facility] EMERGENCY DEPARTMENT     Taylor Corado MD  07/11/25 0417     3 = A little assistance